# Patient Record
Sex: FEMALE | Race: WHITE | NOT HISPANIC OR LATINO | Employment: UNEMPLOYED | URBAN - METROPOLITAN AREA
[De-identification: names, ages, dates, MRNs, and addresses within clinical notes are randomized per-mention and may not be internally consistent; named-entity substitution may affect disease eponyms.]

---

## 2018-08-24 ENCOUNTER — HOSPITAL ENCOUNTER (INPATIENT)
Facility: HOSPITAL | Age: 48
LOS: 2 days | Discharge: HOME | DRG: 530 | End: 2018-08-26
Attending: EMERGENCY MEDICINE | Admitting: INTERNAL MEDICINE
Payer: COMMERCIAL

## 2018-08-24 ENCOUNTER — APPOINTMENT (EMERGENCY)
Dept: RADIOLOGY | Facility: HOSPITAL | Age: 48
DRG: 530 | End: 2018-08-24
Payer: COMMERCIAL

## 2018-08-24 ENCOUNTER — APPOINTMENT (EMERGENCY)
Dept: CT IMAGING | Facility: HOSPITAL | Age: 48
DRG: 530 | End: 2018-08-24
Payer: COMMERCIAL

## 2018-08-24 DIAGNOSIS — C53.9 CERVICAL CANCER (HCC): Primary | ICD-10-CM

## 2018-08-24 DIAGNOSIS — D50.9 MICROCYTIC ANEMIA: ICD-10-CM

## 2018-08-24 DIAGNOSIS — D50.9 IRON DEFICIENCY ANEMIA, UNSPECIFIED IRON DEFICIENCY ANEMIA TYPE: ICD-10-CM

## 2018-08-24 DIAGNOSIS — R07.9 CHEST PAIN: ICD-10-CM

## 2018-08-24 PROBLEM — D64.9 ANEMIA: Status: ACTIVE | Noted: 2018-08-24

## 2018-08-24 LAB
ALBUMIN SERPL BCP-MCNC: 2.9 G/DL (ref 3.5–5)
ALP SERPL-CCNC: 70 U/L (ref 46–116)
ALT SERPL W P-5'-P-CCNC: 21 U/L (ref 12–78)
ANION GAP SERPL CALCULATED.3IONS-SCNC: 6 MMOL/L (ref 4–13)
AST SERPL W P-5'-P-CCNC: 15 U/L (ref 5–45)
BASOPHILS # BLD AUTO: 0.04 THOUSANDS/ΜL (ref 0–0.1)
BASOPHILS NFR BLD AUTO: 0 % (ref 0–1)
BILIRUB SERPL-MCNC: 0.2 MG/DL (ref 0.2–1)
BUN SERPL-MCNC: 8 MG/DL (ref 5–25)
CALCIUM SERPL-MCNC: 7.9 MG/DL (ref 8.3–10.1)
CHLORIDE SERPL-SCNC: 104 MMOL/L (ref 100–108)
CO2 SERPL-SCNC: 26 MMOL/L (ref 21–32)
CREAT SERPL-MCNC: 0.69 MG/DL (ref 0.6–1.3)
DEPRECATED D DIMER PPP: 1553 NG/ML (FEU) (ref 0–424)
EOSINOPHIL # BLD AUTO: 0.06 THOUSAND/ΜL (ref 0–0.61)
EOSINOPHIL NFR BLD AUTO: 1 % (ref 0–6)
ERYTHROCYTE [DISTWIDTH] IN BLOOD BY AUTOMATED COUNT: 15.9 % (ref 11.6–15.1)
GFR SERPL CREATININE-BSD FRML MDRD: 103 ML/MIN/1.73SQ M
GLUCOSE SERPL-MCNC: 125 MG/DL (ref 65–140)
HCT VFR BLD AUTO: 23.1 % (ref 34.8–46.1)
HGB BLD-MCNC: 6.8 G/DL (ref 11.5–15.4)
IMM GRANULOCYTES # BLD AUTO: 0.04 THOUSAND/UL (ref 0–0.2)
IMM GRANULOCYTES NFR BLD AUTO: 0 % (ref 0–2)
LYMPHOCYTES # BLD AUTO: 0.91 THOUSANDS/ΜL (ref 0.6–4.47)
LYMPHOCYTES NFR BLD AUTO: 10 % (ref 14–44)
MCH RBC QN AUTO: 23.3 PG (ref 26.8–34.3)
MCHC RBC AUTO-ENTMCNC: 29.4 G/DL (ref 31.4–37.4)
MCV RBC AUTO: 79 FL (ref 82–98)
MONOCYTES # BLD AUTO: 1.07 THOUSAND/ΜL (ref 0.17–1.22)
MONOCYTES NFR BLD AUTO: 12 % (ref 4–12)
NEUTROPHILS # BLD AUTO: 7.2 THOUSANDS/ΜL (ref 1.85–7.62)
NEUTS SEG NFR BLD AUTO: 77 % (ref 43–75)
NRBC BLD AUTO-RTO: 0 /100 WBCS
PLATELET # BLD AUTO: 484 THOUSANDS/UL (ref 149–390)
PMV BLD AUTO: 8.7 FL (ref 8.9–12.7)
POTASSIUM SERPL-SCNC: 3.6 MMOL/L (ref 3.5–5.3)
PROT SERPL-MCNC: 7.1 G/DL (ref 6.4–8.2)
RBC # BLD AUTO: 2.92 MILLION/UL (ref 3.81–5.12)
SODIUM SERPL-SCNC: 136 MMOL/L (ref 136–145)
TROPONIN I SERPL-MCNC: <0.02 NG/ML
WBC # BLD AUTO: 9.32 THOUSAND/UL (ref 4.31–10.16)

## 2018-08-24 PROCEDURE — 96360 HYDRATION IV INFUSION INIT: CPT

## 2018-08-24 PROCEDURE — 96361 HYDRATE IV INFUSION ADD-ON: CPT

## 2018-08-24 PROCEDURE — 99223 1ST HOSP IP/OBS HIGH 75: CPT | Performed by: INTERNAL MEDICINE

## 2018-08-24 PROCEDURE — 71046 X-RAY EXAM CHEST 2 VIEWS: CPT

## 2018-08-24 PROCEDURE — 99285 EMERGENCY DEPT VISIT HI MDM: CPT

## 2018-08-24 PROCEDURE — 74177 CT ABD & PELVIS W/CONTRAST: CPT

## 2018-08-24 PROCEDURE — 93005 ELECTROCARDIOGRAM TRACING: CPT

## 2018-08-24 PROCEDURE — 85025 COMPLETE CBC W/AUTO DIFF WBC: CPT | Performed by: EMERGENCY MEDICINE

## 2018-08-24 PROCEDURE — 85379 FIBRIN DEGRADATION QUANT: CPT | Performed by: EMERGENCY MEDICINE

## 2018-08-24 PROCEDURE — 80053 COMPREHEN METABOLIC PANEL: CPT | Performed by: EMERGENCY MEDICINE

## 2018-08-24 PROCEDURE — 84484 ASSAY OF TROPONIN QUANT: CPT | Performed by: EMERGENCY MEDICINE

## 2018-08-24 PROCEDURE — 71275 CT ANGIOGRAPHY CHEST: CPT

## 2018-08-24 PROCEDURE — 36415 COLL VENOUS BLD VENIPUNCTURE: CPT | Performed by: EMERGENCY MEDICINE

## 2018-08-24 RX ORDER — ASCORBIC ACID 500 MG
500 TABLET ORAL DAILY
Status: DISCONTINUED | OUTPATIENT
Start: 2018-08-25 | End: 2018-08-26 | Stop reason: HOSPADM

## 2018-08-24 RX ORDER — ACETAMINOPHEN 325 MG/1
650 TABLET ORAL EVERY 6 HOURS PRN
Status: DISCONTINUED | OUTPATIENT
Start: 2018-08-24 | End: 2018-08-26 | Stop reason: HOSPADM

## 2018-08-24 RX ORDER — CHLORAL HYDRATE 500 MG
1000 CAPSULE ORAL DAILY
Status: DISCONTINUED | OUTPATIENT
Start: 2018-08-25 | End: 2018-08-26 | Stop reason: HOSPADM

## 2018-08-24 RX ORDER — ONDANSETRON 2 MG/ML
4 INJECTION INTRAMUSCULAR; INTRAVENOUS EVERY 6 HOURS PRN
Status: DISCONTINUED | OUTPATIENT
Start: 2018-08-24 | End: 2018-08-26 | Stop reason: HOSPADM

## 2018-08-24 RX ORDER — ONDANSETRON 2 MG/ML
4 INJECTION INTRAMUSCULAR; INTRAVENOUS ONCE
Status: DISCONTINUED | OUTPATIENT
Start: 2018-08-24 | End: 2018-08-24

## 2018-08-24 RX ADMIN — IOHEXOL 100 ML: 350 INJECTION, SOLUTION INTRAVENOUS at 21:22

## 2018-08-24 RX ADMIN — SODIUM CHLORIDE 1000 ML: 0.9 INJECTION, SOLUTION INTRAVENOUS at 20:21

## 2018-08-24 NOTE — ED PROVIDER NOTES
History  Chief Complaint   Patient presents with    Chest Pain     EMS reports "PT lives in a hotel, has a tumor on cervix, no oncologist, using natural remedies  Today started with fluttering heart and some right sided chest and rib pain  Pulse in upper 130's gave 500 of NSS and brought it down to 110's  PT states she hasnt been eating or drinking much recently"  PT reports "Using the [employer], high in HCT"     19-year-old female presents for evaluation after contacting EMS because of chest tightness and pain on the left side as well as some fluttering palpitations  Patient's heart rate was noted to be elevated upon EMS arrival   Patient reports that she was diagnosed with stage I cervical cancer in May of this year  Chemotherapy and radiation recommended by the oncologist at that time however she decided to pursue natural remedies  Patient states that she has always been in touch with the plants", 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  Patient has been using Nubia Teresa oil for the treatment of her cancer  This is a concentrated marijuana extract high and THC  Patient reports her appetite has been poor and that she has lost considerable weight  Patient's family convinced her to seek medical attention at this time  Patient reports she has been having some suprapubic and left lower quadrant abdominal pain as well          History provided by:  Patient   used: No    Chest Pain   Pain location:  L chest  Pain quality: aching and tightness    Pain radiates to:  Does not radiate  Pain radiates to the back: no    Pain severity:  Mild  Onset quality:  Gradual  Duration:  1 day  Timing:  Rare  Progression:  Waxing and waning  Chronicity:  New  Relieved by:  None tried  Worsened by:  Nothing tried  Ineffective treatments:  None tried  Associated symptoms: abdominal pain, anxiety, fatigue, nausea and palpitations    Associated symptoms: no cough, no diaphoresis, no fever, no shortness of breath and not vomiting        Prior to Admission Medications   Prescriptions Last Dose Informant Patient Reported? Taking? Ascorbic Acid (VITAMIN C ER PO)   Yes Yes   Sig: Take by mouth   Omega-3 Fatty Acids (FISH OIL CONCENTRATE PO)   Yes Yes   Sig: Take by mouth      Facility-Administered Medications: None       Past Medical History:   Diagnosis Date    Cervical cancer ([facility] [address] )     cervical       History reviewed  No pertinent surgical history  History reviewed  No pertinent family history  I have reviewed and agree with the history as documented  Social History   Substance Use Topics    Smoking status: Never Smoker    Smokeless tobacco: Never Used    Alcohol use No        Review of Systems   Constitutional: Positive for fatigue and unexpected weight change  Negative for chills, diaphoresis and fever  Respiratory: Negative for cough and shortness of breath  Cardiovascular: Positive for chest pain and palpitations  Gastrointestinal: Positive for abdominal pain and nausea  Negative for diarrhea and vomiting  Genitourinary: Negative for dysuria and frequency  Skin: Negative for rash  All other systems reviewed and are negative  Physical Exam  Physical Exam   Constitutional: She is oriented to person, place, and time  She appears well-developed and well-nourished  She appears distressed (mild)  Very thin, possibly cachectic   HENT:   Head: Normocephalic and atraumatic  Eyes: EOM are normal  Pupils are equal, round, and reactive to light  Neck: Normal range of motion  No JVD present  Cardiovascular: Regular rhythm, normal heart sounds and intact distal pulses  Tachycardia present  Exam reveals no gallop and no friction rub  No murmur heard  Pulmonary/Chest: Effort normal and breath sounds normal  No respiratory distress  She has no wheezes  She has no rales  She exhibits no tenderness     Abdominal: There is tenderness (left lower quadrant)  Musculoskeletal: Normal range of motion  She exhibits no tenderness  Neurological: She is alert and oriented to person, place, and time  Skin: Skin is warm and dry  Psychiatric: She has a normal mood and affect  Her behavior is normal  Judgment and thought content normal    Nursing note and vitals reviewed  Vital Signs  ED Triage Vitals [08/24/18 1953]   Temperature Pulse Respirations Blood Pressure SpO2   97 7 °F (36 5 °C) 100 18 127/71 100 %      Temp Source Heart Rate Source Patient Position - Orthostatic VS BP Location FiO2 (%)   Oral Monitor Lying Right arm --      Pain Score       --           Vitals:    08/24/18 1953   BP: 127/71   Pulse: 100   Patient Position - Orthostatic VS: Lying       Visual Acuity      ED Medications  Medications   sodium chloride 0 9 % bolus 1,000 mL (1,000 mL Intravenous New Bag 8/24/18 2021)   iohexol (OMNIPAQUE) 350 MG/ML injection (MULTI-DOSE) 100 mL (100 mL Intravenous Given 8/24/18 2122)       Diagnostic Studies  Results Reviewed     Procedure Component Value Units Date/Time    Comprehensive metabolic panel [[id number]]  (Abnormal) Collected:  08/24/18 2024    Lab Status:  Final result Specimen:  Blood from Arm, Left Updated:  08/24/18 2111     Sodium 136 mmol/L      Potassium 3 6 mmol/L      Chloride 104 mmol/L      CO2 26 mmol/L      Anion Gap 6 mmol/L      BUN 8 mg/dL      Creatinine 0 69 mg/dL      Glucose 125 mg/dL      Calcium 7 9 (L) mg/dL      AST 15 U/L      ALT 21 U/L      Alkaline Phosphatase 70 U/L      Total Protein 7 1 g/dL      Albumin 2 9 (L) g/dL      Total Bilirubin 0 20 mg/dL      eGFR 103 ml/min/1 73sq m     Narrative:         National Kidney Disease Education Program recommendations are as follows:  GFR calculation is accurate only with a steady state creatinine  Chronic Kidney disease less than 60 ml/min/1 73 sq  meters  Kidney failure less than 15 ml/min/1 73 sq  meters      D-Dimer [[id number]]  (Abnormal) Collected:  08/24/18 2024 Lab Status:  Final result Specimen:  Blood from Arm, Left Updated:  08/24/18 2108     D-Dimer, Quant 1,553 (H) ng/ml (FEU)     Troponin I [[id number]]  (Normal) Collected:  08/24/18 2024    Lab Status:  Final result Specimen:  Blood from Arm, Left Updated:  08/24/18 2106     Troponin I <0 02 ng/mL     CBC and differential [[id number]]  (Abnormal) Collected:  08/24/18 2024    Lab Status:  Final result Specimen:  Blood from Arm, Left Updated:  08/24/18 2050     WBC 9 32 Thousand/uL      RBC 2 92 (L) Million/uL      Hemoglobin 6 8 (LL) g/dL      Hematocrit 23 1 (L) %      MCV 79 (L) fL      MCH 23 3 (L) pg      MCHC 29 4 (L) g/dL      RDW 15 9 (H) %      MPV 8 7 (L) fL      Platelets 134 (H) Thousands/uL      nRBC 0 /100 WBCs      Neutrophils Relative 77 (H) %      Immat GRANS % 0 %      Lymphocytes Relative 10 (L) %      Monocytes Relative 12 %      Eosinophils Relative 1 %      Basophils Relative 0 %      Neutrophils Absolute 7 20 Thousands/µL      Immature Grans Absolute 0 04 Thousand/uL      Lymphocytes Absolute 0 91 Thousands/µL      Monocytes Absolute 1 07 Thousand/µL      Eosinophils Absolute 0 06 Thousand/µL      Basophils Absolute 0 04 Thousands/µL                  PE Study with CT Abdomen and Pelvis with contrast   Final Result by Fransisca Gill MD (08/24 2145)      Heterogeneous uterus with endocervical fluid, correlate with pelvic ultrasound  Patient has known history of cervical cancer  Bilateral enlarged pelvic sidewall lymph nodes are identified measuring approximately 1 1 cm on the right and 0 8 cm on the left suspicious for metastatic disease of cervical cancer  Oncologic consult is recommended and necessary when clinically    warranted  High density fluid is identified in the cul-de-sac which although could be physiologic for a patient of this age, although with history of cervical cancer this is indeterminate                Workstation performed: [id number]         XR chest 2 views   ED Interpretation by Melly Lay MD (08/24 2043)   This film was interpreted independently by me  No infiltrate, cardiac silhouette normal, no pleural effusions or pulmonary edema  Procedures  Procedures       Phone Contacts  ED Phone Contact    ED Course  ED Course as of Aug 24 2221   Fri Aug 24, 2018   2215 I relayed to the patient the findings of the CT scan indicating that it was likely that her cancer had spread to nearby lymph nodes which would characterize her cancer as metastatic  She understands but would like to finish her Theone Divehi oil regimen  Additionally I relayed to her the findings of anemia (microcytic) and that she was at a level that would commonly cause us to perform a transfusion  She is not interested in that at this time  She is willing to be admitted so that she can have a hematology/oncology evaluation                                   MDM  Number of Diagnoses or Management Options  Cervical cancer [facility]): new and requires workup  Chest pain: new and requires workup  Microcytic anemia: new and requires workup  Diagnosis management comments: Background: 49 y o  female presents with chest pain, palpitations, weight loss and abdominal pain in the setting of essentially untreated cervical cancer    Differential DX includes but is not limited to: acs, metabolic derangement, renal failure, metastatic spread, PE, anemia    Plan: cbc, metabolic panel, ct abdomen, ekg, troponin, ddimer          Amount and/or Complexity of Data Reviewed  Clinical lab tests: ordered and reviewed  Tests in the radiology section of CPT®: ordered and reviewed  Independent visualization of images, tracings, or specimens: yes    Risk of Complications, Morbidity, and/or Mortality  Presenting problems: high  Diagnostic procedures: high  Management options: high    Patient Progress  Patient progress: stable    CritCare Time    Disposition  Final diagnoses:   Microcytic anemia - acute   Cervical cancer [facility]) - with likely metastatic disease to lymph nodes   Chest pain     Time reflects when diagnosis was documented in both MDM as applicable and the Disposition within this note     Time User Action Codes Description Comment    8/24/2018 10:20 PM Lazarus Cedrick Add [D50 9] Microcytic anemia     8/24/2018 10:20 PM Glenora Mass B Modify [D50 9] Microcytic anemia acute    8/24/2018 10:20 PM Glenora Mass B Add [C53 9] Cervical cancer ([facility] [address] )     8/24/2018 10:20 PM Glenora Mass B Modify [C53 9] Cervical cancer [facility]) with likely metastatic disease to lymph nodes    8/24/2018 10:20 PM Lazarus Cedrick Add [R07 9] Chest pain     8/24/2018 10:20 PM Glenora Mass B Modify [D50 9] Microcytic anemia acute    8/24/2018 10:20 PM Glenora Mass B Modify [C53 9] Cervical cancer [facility]) with likely metastatic disease to lymph nodes      ED Disposition     ED Disposition Condition Comment    Admit  Case was discussed with Ruthann Kaufman and the patient's admission status was agreed to be Admission Status: inpatient status to the service of Dr Beni Garg   Follow-up Information    None         Patient's Medications   Discharge Prescriptions    No medications on file     No discharge procedures on file      ED Provider  Electronically Signed by           Ignacio Steel MD  08/24/18 4959

## 2018-08-25 LAB
ABO GROUP BLD: NORMAL
ANION GAP SERPL CALCULATED.3IONS-SCNC: 6 MMOL/L (ref 4–13)
BLD GP AB SCN SERPL QL: NEGATIVE
BUN SERPL-MCNC: 6 MG/DL (ref 5–25)
CALCIUM SERPL-MCNC: 8.3 MG/DL (ref 8.3–10.1)
CHLORIDE SERPL-SCNC: 106 MMOL/L (ref 100–108)
CO2 SERPL-SCNC: 27 MMOL/L (ref 21–32)
CREAT SERPL-MCNC: 0.6 MG/DL (ref 0.6–1.3)
ERYTHROCYTE [DISTWIDTH] IN BLOOD BY AUTOMATED COUNT: 15.9 % (ref 11.6–15.1)
FERRITIN SERPL-MCNC: 19 NG/ML (ref 8–388)
GFR SERPL CREATININE-BSD FRML MDRD: 108 ML/MIN/1.73SQ M
GLUCOSE SERPL-MCNC: 101 MG/DL (ref 65–140)
HCT VFR BLD AUTO: 26.3 % (ref 34.8–46.1)
HCT VFR BLD AUTO: 34.3 % (ref 34.8–46.1)
HEMOCCULT STL QL: NEGATIVE
HGB BLD-MCNC: 10.4 G/DL (ref 11.5–15.4)
HGB BLD-MCNC: 7.7 G/DL (ref 11.5–15.4)
IRON SATN MFR SERPL: 5 %
IRON SERPL-MCNC: 15 UG/DL (ref 50–170)
MCH RBC QN AUTO: 22.8 PG (ref 26.8–34.3)
MCHC RBC AUTO-ENTMCNC: 29.3 G/DL (ref 31.4–37.4)
MCV RBC AUTO: 78 FL (ref 82–98)
PLATELET # BLD AUTO: 336 THOUSANDS/UL (ref 149–390)
PMV BLD AUTO: 9.5 FL (ref 8.9–12.7)
POTASSIUM SERPL-SCNC: 4.2 MMOL/L (ref 3.5–5.3)
RBC # BLD AUTO: 3.37 MILLION/UL (ref 3.81–5.12)
RH BLD: POSITIVE
SODIUM SERPL-SCNC: 139 MMOL/L (ref 136–145)
SPECIMEN EXPIRATION DATE: NORMAL
TIBC SERPL-MCNC: 275 UG/DL (ref 250–450)
TROPONIN I SERPL-MCNC: <0.02 NG/ML
TROPONIN I SERPL-MCNC: <0.02 NG/ML
TSH SERPL DL<=0.05 MIU/L-ACNC: 0.86 UIU/ML (ref 0.36–3.74)
WBC # BLD AUTO: 8.14 THOUSAND/UL (ref 4.31–10.16)

## 2018-08-25 PROCEDURE — 83540 ASSAY OF IRON: CPT | Performed by: PHYSICIAN ASSISTANT

## 2018-08-25 PROCEDURE — 84484 ASSAY OF TROPONIN QUANT: CPT | Performed by: PHYSICIAN ASSISTANT

## 2018-08-25 PROCEDURE — 86901 BLOOD TYPING SEROLOGIC RH(D): CPT | Performed by: PHYSICIAN ASSISTANT

## 2018-08-25 PROCEDURE — 85027 COMPLETE CBC AUTOMATED: CPT | Performed by: PHYSICIAN ASSISTANT

## 2018-08-25 PROCEDURE — 99252 IP/OBS CONSLTJ NEW/EST SF 35: CPT | Performed by: INTERNAL MEDICINE

## 2018-08-25 PROCEDURE — P9021 RED BLOOD CELLS UNIT: HCPCS

## 2018-08-25 PROCEDURE — 85014 HEMATOCRIT: CPT | Performed by: INTERNAL MEDICINE

## 2018-08-25 PROCEDURE — 86850 RBC ANTIBODY SCREEN: CPT | Performed by: PHYSICIAN ASSISTANT

## 2018-08-25 PROCEDURE — 82728 ASSAY OF FERRITIN: CPT | Performed by: PHYSICIAN ASSISTANT

## 2018-08-25 PROCEDURE — 82272 OCCULT BLD FECES 1-3 TESTS: CPT | Performed by: PHYSICIAN ASSISTANT

## 2018-08-25 PROCEDURE — 80048 BASIC METABOLIC PNL TOTAL CA: CPT | Performed by: PHYSICIAN ASSISTANT

## 2018-08-25 PROCEDURE — 84443 ASSAY THYROID STIM HORMONE: CPT | Performed by: PHYSICIAN ASSISTANT

## 2018-08-25 PROCEDURE — 85018 HEMOGLOBIN: CPT | Performed by: INTERNAL MEDICINE

## 2018-08-25 PROCEDURE — 83550 IRON BINDING TEST: CPT | Performed by: PHYSICIAN ASSISTANT

## 2018-08-25 PROCEDURE — 30233N1 TRANSFUSION OF NONAUTOLOGOUS RED BLOOD CELLS INTO PERIPHERAL VEIN, PERCUTANEOUS APPROACH: ICD-10-PCS | Performed by: INTERNAL MEDICINE

## 2018-08-25 PROCEDURE — 86900 BLOOD TYPING SEROLOGIC ABO: CPT | Performed by: PHYSICIAN ASSISTANT

## 2018-08-25 PROCEDURE — 86920 COMPATIBILITY TEST SPIN: CPT

## 2018-08-25 RX ADMIN — OXYCODONE HYDROCHLORIDE AND ACETAMINOPHEN 500 MG: 500 TABLET ORAL at 10:16

## 2018-08-25 RX ADMIN — Medication 1000 MG: at 10:15

## 2018-08-25 NOTE — NURSING NOTE
Patient refused SCD's  Pt stated she "doesnt want to be connected to a machine"  Educated pt on reason and importance of wearing them but still declines at this time

## 2018-08-25 NOTE — CONSULTS
Consultation - Hao Mei 49 y o  female MRN: [id number]    Unit/Bed#: -01 Encounter: [id number]      Assessment/Plan     Assessment:  In summary, this is a 39-year-old female history of squamous cell carcinoma of the cervix who has pursued natural therapies over the past 4 months  CT scan shows some heterogeneity of the uterus, possibly representing metastatic disease  We reviewed that it is possible that her cancer is curable at this time 3 standard therapies including radiation, chemotherapy, surgery, or some combination of these  Also we reviewed that delay in treatment initiation could result in decreased likely survival, cure, etc   In other words, delay in treatment could lead to death from progressive cancer  Additionally, we reviewed that her anemia is due to iron deficiency  She denies any vaginal bleeding, melena, hematuria, etc   Etiology of iron deficiency is unclear  This may reflect deficient dietary intake due to dietary modification  Oral iron replacement was recommended  Parental iron replacement also considered  The patient declined both of these  She is clear that she does not wish to pursue radiation, chemotherapy, surgery  She voiced understanding of the above discussion and understands that without optimal therapy her cancer will most likely be progressive and eventually fatal   She wishes to continue pursuing natural treatments    Plan:  See above  History of Present Illness     HPI: Hao Mei is a 49y o  year old female who presents with shortness of breath  On admission hemoglobin was 6 8 April 2018 patient was diagnosed with apparently 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  She saw a physician in [place] who recommended standard therapies, surgical resection, etc   The patient declined an opted for natural therapies, THC oral, etc   She now presents with shortness of breath and severe anemia  WBC 9 3, MCV 79, platelet count 812  Iron saturation 5%, ferritin 19  BMP normal   CT Chest abdomen pelvis showed subcentimeter renal hypodensities  Heterogeneous uterus with endocervical fluid  Bilateral pelvic nodes up to 1 1 cm  No pulmonary lesions identified  Consults    Review of Systems   Constitutional: Positive for fatigue and unexpected weight change (10 lb weight loss over the past 4 months )  Negative for appetite change, diaphoresis and fever  HENT: Negative for sinus pain  Eyes: Negative for discharge  Respiratory: Positive for shortness of breath  Negative for cough  Cardiovascular: Negative for chest pain  Gastrointestinal: Negative for abdominal pain, constipation and diarrhea  Endocrine: Negative for cold intolerance  Genitourinary: Negative for difficulty urinating and hematuria  Musculoskeletal: Negative for joint swelling  Skin: Negative for rash  Allergic/Immunologic: Negative for environmental allergies  Neurological: Negative for dizziness and headaches  Hematological: Negative for adenopathy  Psychiatric/Behavioral: Negative for agitation  Historical Information   Past Medical History:   Diagnosis Date    Cervical cancer ([facility] [address] )     cervical     History reviewed  No pertinent surgical history  Social History   History   Alcohol Use No     History   Drug Use No     History   Smoking Status    Never Smoker   Smokeless Tobacco    Never Used     Family History: History reviewed  No pertinent family history  Meds/Allergies   all current active meds have been reviewed  No Known Allergies    Objective   Vitals: Blood pressure 107/56, pulse 72, temperature 98 5 °F (36 9 °C), temperature source Oral, resp  rate 18, weight 49 1 kg (108 lb 3 9 oz), SpO2 100 %      Intake/Output Summary (Last 24 hours) at 08/25/18 1514  Last data filed at 08/24/18 2225   Gross per 24 hour   Intake             1000 ml   Output                0 ml   Net             1000 ml     Invasive Devices     Peripheral Intravenous Line            Peripheral IV 08/25/18 Left Arm less than 1 day                Physical Exam   Constitutional: She is oriented to person, place, and time  She appears well-developed  HENT:   Head: Normocephalic  Eyes: Pupils are equal, round, and reactive to light  Neck: Neck supple  Cardiovascular: Normal rate  No murmur heard  Pulmonary/Chest: No respiratory distress  She has no wheezes  She has no rales  Abdominal: Soft  She exhibits no distension  There is no tenderness  There is no rebound  Musculoskeletal: She exhibits no edema  Lymphadenopathy:     She has no cervical adenopathy  Neurological: She is alert and oriented to person, place, and time  She displays normal reflexes  Skin: Skin is warm  No rash noted  Psychiatric: She has a normal mood and affect  Thought content normal        Lab Results: I have personally reviewed pertinent reports  Imaging Studies: I have personally reviewed pertinent reports  EKG, Pathology, and Other Studies: I have personally reviewed pertinent reports  Code Status: Level 1 - Full Code  Advance Directive and Living Will:      Power of :    POLST:      Counseling / Coordination of Care  Total floor / unit time spent today 40 minutes  Greater than 50% of total time was spent with the patient and / or family counseling and / or coordination of care  A description of the counseling / coordination of care:  See note

## 2018-08-25 NOTE — ASSESSMENT & PLAN NOTE
· Diagnosed with SCC of the cervix in April 2018 and has been using only natural remedies since then  · CTA chest, abdomen, pelvis: Bilateral enlarged pelvic sidewall lymph nodes are identified measuring approximately 1 1 cm on the right and 0 8 cm on the left suspicious for metastatic disease of cervical cancer  High density fluid is identified in the cul-de-sac  · Patient agreeable to medical oncology consult  Not agreeable to GYN oncology consult at this time  · Medical oncology consulted

## 2018-08-25 NOTE — ASSESSMENT & PLAN NOTE
· Hg 6 8 on presentation  · Patient declined blood transfusion in the ED  · Anemia is microcytic  · Consider IV Venofer  · Obtain iron panel, check fecal occult blood  · Hematology oncology consulted  · Repeat CBC in AM    · Informed patient that if there is a significant drop in her Hg overnight, blood transfusion will need to be re-addressed     · Obtain type and screen in AM

## 2018-08-25 NOTE — PLAN OF CARE
Problem: DISCHARGE PLANNING - CARE MANAGEMENT  Goal: Discharge to post-acute care or home with appropriate resources  INTERVENTIONS:  - Conduct assessment to determine patient/family and health care team treatment goals, and need for post-acute services based on payer coverage, community resources, and patient preferences, and barriers to discharge  - Address psychosocial, clinical, and financial barriers to discharge as identified in assessment in conjunction with the patient/family and health care team  - Arrange appropriate level of post-acute services according to patients   needs and preference and payer coverage in collaboration with the physician and health care team  - Communicate with and update the patient/family, physician, and health care team regarding progress on the discharge plan  - Arrange appropriate transportation to post-acute venues   Outcome: Progressing  CM received a call from pt's family friend that Pt was homeless  CM met with Pt at bedside  Pt reports she was staying in a hotel prior to hospitalization but is working on finding a place to live  Pt reports she is going to talk to her family as she was offered a room at the home of a friend from [employer]  Pt reports she has 4 daughters that are staying with family that she is in communication with  Pt does not have a history of DME, HHC, or Rehab  Pt denies any MH or D&A history  Pt reports she has a car but isn't currently driving, her family and friends assist her if she needs something  Pt does not have a POA and does not want info at this time  CM offered Pt shelter list but Pt declines and states she does not want to go to a shelter, she will have a plan in place  CM dept will follow Pt until discharge

## 2018-08-25 NOTE — SOCIAL WORK
CM received a call from pt's family friend that Pt was homeless  CM met with Pt at bedside  Pt reports she was staying in a hotel prior to hospitalization but is working on finding a place to live  Pt reports she is going to talk to her family as she was offered a room at the home of a friend from [employer]  Pt reports she has 4 daughters that are staying with family that she is in communication with  Pt does not have a history of DME, HHC, or Rehab  Pt denies any MH or D&A history  Pt reports she has a car but isn't currently driving, her family and friends assist her if she needs something  Pt does not have a POA and does not want info at this time  CM offered Pt shelter list but Pt declines and states she does not want to go to a shelter, she will have a plan in place  CM dept will follow Pt until discharge

## 2018-08-25 NOTE — ED NOTES
PT ambulated to bathroom and back to ED room, steady gait, no distress, no other complaints         April Kiki Dozier RN  08/24/18 5949

## 2018-08-25 NOTE — H&P
H&P- Boom Jimmy 1970, 49 y o  female MRN: [id number]    Unit/Bed#: -01 Encounter: [id number]    Primary Care Provider: Lashae Neil DO   Date and time admitted to hospital: 2018  7:40 PM        Microcytic anemia   Assessment & Plan    · Hg 6 8 on presentation  · Patient declined blood transfusion in the ED  · Anemia is microcytic  · Consider IV Venofer  · Obtain iron panel, check fecal occult blood  · Hematology oncology consulted  · Repeat CBC in AM    · Informed patient that if there is a significant drop in her Hg overnight, blood transfusion will need to be re-addressed  · Obtain type and screen in AM          * Chest pain   Assessment & Plan    · Presented with chest heaviness, palpitations  Pain improved at this time  · Initial troponin negative  · Monitor on telemetry overnight and trend troponin  · D-dimer elevated, therefore, CTA chest was obtained and showed no evidence of PE  Cervical cancer [facility])   Assessment & Plan    · Diagnosed with SCC of the cervix in 2018 and has been using only natural remedies since then  · CTA chest, abdomen, pelvis: Bilateral enlarged pelvic sidewall lymph nodes are identified measuring approximately 1 1 cm on the right and 0 8 cm on the left suspicious for metastatic disease of cervical cancer  High density fluid is identified in the cul-de-sac  · Patient agreeable to medical oncology consult  Not agreeable to GYN oncology consult at this time  · Medical oncology consulted  VTE Prophylaxis: Pharmacologic VTE Prophylaxis contraindicated due to anemia  / sequential compression device   Code Status: Level 1- Full Code   POLST: POLST form is not discussed and not completed at this time  Discussion with family: patient's daughter at bedside    Anticipated Length of Stay:  Patient will be admitted on an Inpatient basis with an anticipated length of stay of > 2 midnights     Justification for Hospital Stay: cervical CA, anemia  Need for oncology evaluation  Total Time for Visit, including Counseling / Coordination of Care: 45 minutes  Greater than 50% of this total time spent on direct patient counseling and coordination of care  Chief Complaint:   Chest pain    History of Present Illness:    Marissa Baird is a 49 y o  female with a recent diagnosis of cervical cancer who presents with chest pain  Patient notes intermittent left sided lower rib pain over the past few weeks since reportedly twisting/ pulling a muscle  She notes that her pain has not been worsening but has been persistent  Today, the patient reportedly developed chest heaviness and palpitations, feeling as though her heart was racing  She attributes some of her symptoms to anxiety  Patient was diagnosed with cervical cancer in April 2018 at [facility] and has since opted for natural remedies and states that she is not currently interested in chemotherapy, radiation or surgery  She has been using [employer] which is a cannabis oil product and notes that this has helped with her pelvic pain and she also notes that she has 10 days left of the treatment which she would like to complete  She notes that she initially did not have much of an appetite due to her abdominal pain but now that her abdominal pain has improved, she notes that her intake has remained limited as she watches what she eats in order to avoid foods that will "feed the cancer " She believes she may have lost some weight recently but is uncertain of how much weight she has lost  She denies recent fevers/ chills, notes occasional night sweats  She denies any vaginal bleeding over the past few days but has continued to have intermittent vaginal bleeding over the past few months since her diagnosis which she states are "like periods " She denies SOB at this time but notes that she has been trying to be aware of her breathing   She notes that her chest heaviness/ palpitations have improved since coming to the hospital  Patient reports that she has been taking vitamin C on a regular basis and has been seeking IV vitamin C  On arrival to the hospital, initial troponin was negative and CTA of the chest was obtained, revealing no evidence of PE but showed b/l enlarged pelvic sidewall lymph nodes measuring 1 1 cm on the right and 0 8 cm on the left suspicious for metastatic disease  Patient was also noted to have a Hg of 6 8 on admission and reportedly declined blood transfusion in the ED  Review of Systems:    Review of Systems   Constitutional: Positive for appetite change, diaphoresis (night sweats) and unexpected weight change  Cardiovascular: Positive for chest pain and palpitations  Genitourinary: Positive for vaginal bleeding (intermittent)  All other systems reviewed and are negative  Past Medical and Surgical History:     Past Medical History:   Diagnosis Date    Cervical cancer ([facility] [address] )     cervical       History reviewed  No pertinent surgical history  Meds/Allergies:    Prior to Admission medications    Medication Sig Start Date End Date Taking? Authorizing Provider   Ascorbic Acid (VITAMIN C ER PO) Take by mouth   Yes Historical Provider, MD   Omega-3 Fatty Acids (FISH OIL CONCENTRATE PO) Take by mouth   Yes Historical Provider, MD     I have reviewed home medications with patient personally  Allergies: No Known Allergies    Social History:     Marital Status:    Patient Pre-hospital Living Situation: Resides at home  Patient Pre-hospital Level of Mobility: ambulates without assistance  Patient Pre-hospital Diet Restrictions: none  Substance Use History:   History   Alcohol Use No     History   Smoking Status    Never Smoker   Smokeless Tobacco    Never Used     History   Drug Use No       Family History:    History reviewed  No pertinent family history      Physical Exam:     Vitals:   Blood Pressure: 114/57 (08/24/18 2252)  Pulse: 81 (08/24/18 2252)  Temperature: 98 8 °F (37 1 °C) (08/24/18 2252)  Temp Source: Oral (08/24/18 2252)  Respirations: 18 (08/24/18 2252)  Weight - Scale: 49 1 kg (108 lb 3 9 oz) (08/24/18 1953)  SpO2: 100 % (08/24/18 2252)    Physical Exam   Constitutional: She is oriented to person, place, and time  She appears well-developed and well-nourished  She appears ill  No distress  Thin   HENT:   Head: Normocephalic and atraumatic  Eyes: Conjunctivae are normal    Cardiovascular: Regular rhythm  Tachycardia present  Pulmonary/Chest: Effort normal and breath sounds normal  No respiratory distress  Abdominal: Soft  Bowel sounds are normal  There is no tenderness  Musculoskeletal: Normal range of motion  She exhibits no edema  Neurological: She is alert and oriented to person, place, and time  Skin: Skin is warm and dry  She is not diaphoretic  There is pallor  Psychiatric: She has a normal mood and affect  Her behavior is normal    Nursing note and vitals reviewed  Additional Data:     Lab Results: I have personally reviewed pertinent reports  Results from last 7 days  Lab Units 08/24/18 2024   WBC Thousand/uL 9 32   HEMOGLOBIN g/dL 6 8*   HEMATOCRIT % 23 1*   PLATELETS Thousands/uL 484*   NEUTROS PCT % 77*   LYMPHS PCT % 10*   MONOS PCT % 12   EOS PCT % 1       Results from last 7 days  Lab Units 08/24/18 2024   SODIUM mmol/L 136   POTASSIUM mmol/L 3 6   CHLORIDE mmol/L 104   CO2 mmol/L 26   BUN mg/dL 8   CREATININE mg/dL 0 69   CALCIUM mg/dL 7 9*   TOTAL PROTEIN g/dL 7 1   BILIRUBIN TOTAL mg/dL 0 20   ALK PHOS U/L 70   ALT U/L 21   AST U/L 15   GLUCOSE RANDOM mg/dL 125                   Imaging: I have personally reviewed pertinent reports  PE Study with CT Abdomen and Pelvis with contrast   Final Result by Christiano Soares MD (08/24 2145)      Heterogeneous uterus with endocervical fluid, correlate with pelvic ultrasound  Patient has known history of cervical cancer        Bilateral enlarged pelvic sidewall lymph nodes are identified measuring approximately 1 1 cm on the right and 0 8 cm on the left suspicious for metastatic disease of cervical cancer  Oncologic consult is recommended and necessary when clinically    warranted  High density fluid is identified in the cul-de-sac which although could be physiologic for a patient of this age, although with history of cervical cancer this is indeterminate  Workstation performed: [id number]         XR chest 2 views   ED Interpretation by Lory Noriega MD (08/24 2043)   This film was interpreted independently by me  No infiltrate, cardiac silhouette normal, no pleural effusions or pulmonary edema  Allscripts / Epic Records Reviewed: Yes     ** Please Note: This note has been constructed using a voice recognition system   **

## 2018-08-25 NOTE — NURSING NOTE
Patient refused telemetry  Pt stated that "she knows her heart is ok and she doesn't need it"  Educated patient on reasons for telemetry and what it does and pt still declines  Austin Malik from [facility] aware, will continue to monitor

## 2018-08-25 NOTE — ASSESSMENT & PLAN NOTE
· Presented with chest heaviness, palpitations  Pain improved at this time  · Initial troponin negative  · Monitor on telemetry overnight and trend troponin     · D-dimer elevated, therefore, CTA chest was obtained and showed no evidence of PE

## 2018-08-26 VITALS
OXYGEN SATURATION: 96 % | RESPIRATION RATE: 16 BRPM | DIASTOLIC BLOOD PRESSURE: 73 MMHG | HEART RATE: 75 BPM | WEIGHT: 108.25 LBS | SYSTOLIC BLOOD PRESSURE: 118 MMHG | TEMPERATURE: 98.3 F

## 2018-08-26 PROBLEM — R06.00 DYSPNEA: Status: ACTIVE | Noted: 2018-08-26

## 2018-08-26 PROBLEM — E44.0 MODERATE PROTEIN-CALORIE MALNUTRITION (HCC): Status: ACTIVE | Noted: 2018-08-26

## 2018-08-26 PROBLEM — R07.9 CHEST PAIN: Status: RESOLVED | Noted: 2018-08-24 | Resolved: 2018-08-26

## 2018-08-26 PROBLEM — R06.00 DYSPNEA: Status: RESOLVED | Noted: 2018-08-26 | Resolved: 2018-08-26

## 2018-08-26 LAB
ABO GROUP BLD BPU: NORMAL
BPU ID: NORMAL
CROSSMATCH: NORMAL
ERYTHROCYTE [DISTWIDTH] IN BLOOD BY AUTOMATED COUNT: 15.9 % (ref 11.6–15.1)
HCT VFR BLD AUTO: 32.9 % (ref 34.8–46.1)
HGB BLD-MCNC: 10 G/DL (ref 11.5–15.4)
MCH RBC QN AUTO: 23.8 PG (ref 26.8–34.3)
MCHC RBC AUTO-ENTMCNC: 30.4 G/DL (ref 31.4–37.4)
MCV RBC AUTO: 78 FL (ref 82–98)
PLATELET # BLD AUTO: 414 THOUSANDS/UL (ref 149–390)
PMV BLD AUTO: 8.5 FL (ref 8.9–12.7)
RBC # BLD AUTO: 4.21 MILLION/UL (ref 3.81–5.12)
UNIT DISPENSE STATUS: NORMAL
UNIT PRODUCT CODE: NORMAL
UNIT RH: NORMAL
WBC # BLD AUTO: 8.38 THOUSAND/UL (ref 4.31–10.16)

## 2018-08-26 PROCEDURE — 85027 COMPLETE CBC AUTOMATED: CPT | Performed by: INTERNAL MEDICINE

## 2018-08-26 PROCEDURE — 99239 HOSP IP/OBS DSCHRG MGMT >30: CPT | Performed by: INTERNAL MEDICINE

## 2018-08-26 RX ORDER — FERROUS SULFATE TAB EC 324 MG (65 MG FE EQUIVALENT) 324 (65 FE) MG
324 TABLET DELAYED RESPONSE ORAL
Qty: 60 TABLET | Refills: 0 | Status: SHIPPED | OUTPATIENT
Start: 2018-08-26

## 2018-08-26 RX ADMIN — Medication 1000 MG: at 09:25

## 2018-08-26 NOTE — CASE MANAGEMENT
Initial Clinical Review    Admission: Date/Time/Statement: 8/24/18 @ 2221     Orders Placed This Encounter   Procedures    Inpatient Admission (expected length of stay for this patient is greater than two midnights)     Standing Status:   Standing     Number of Occurrences:   1     Order Specific Question:   Admitting Physician     Answer:   Andrew Oshea [[id number]]     Order Specific Question:   Level of Care     Answer:   Med Surg [[id number]]     Order Specific Question:   Estimated length of stay     Answer:   More than 2 Midnights     Order Specific Question:   Certification     Answer:   I certify that inpatient services are medically necessary for this patient for a duration of greater than two midnights  See H&P and MD Progress Notes for additional information about the patient's course of treatment  48 yo female brought to ED by EMS, tachycardia,  chest pain  Hgb  6 8  Pt diagnosed with cervical cancer 4/2018 and has been treating with natural remedies  She initially refused blood transfusion 8/24  On 8/25 she consented to and received 1 unit of PRBC's  She has consented to oncology evaluation  ED: Date/Time/Mode of Arrival:   ED Arrival Information     Expected Arrival Acuity Means of Arrival Escorted By Service Admission Type    - 8/24/2018 19:40 Urgent Ambulance [facility] General Medicine Urgent    Arrival Complaint    -         Hct 23 1  Chief Complaint:   Chief Complaint   Patient presents with    Chest Pain     EMS reports "PT lives in a hotel, has a tumor on cervix, no oncologist, using natural remedies  Today started with fluttering heart and some right sided chest and rib pain  Pulse in upper 130's gave 500 of NSS and brought it down to 110's   PT states she hasnt been eating or drinking much recently"  PT reports "Using the [employer], high in HCT"       History of Illness: Marcus Johnson is a 49 y o  female with a recent diagnosis of cervical cancer who presents with chest pain  Today, the patient reportedly developed chest heaviness and palpitations, feeling as though her heart was racing  She attributes some of her symptoms to anxiety  Patient was diagnosed with cervical cancer in April 2018 at [facility] and has since opted for natural remedies and states that she is not currently interested in chemotherapy, radiation or surgery  She has been using [employer] which is a cannabis oil product and notes that this has helped with her pelvic pain and she also notes that she has 10 days left of the treatment which she would like to complete  She has continued to have intermittent vaginal bleeding over the past few months since her diagnosis   On arrival to the hospital, initial troponin was negative and CTA of the chest was obtained, revealing no evidence of PE but showed b/l enlarged pelvic sidewall lymph nodes measuring 1 1 cm on the right and 0 8 cm on the left suspicious for metastatic disease  Patient was also noted to have a Hg of 6 8 on admission and reportedly declined blood transfusion in the ED       ED Vital Signs:   ED Triage Vitals   Temperature Pulse Respirations Blood Pressure SpO2   08/24/18 1953 08/24/18 1953 08/24/18 1953 08/24/18 1953 08/24/18 1953   97 7 °F (36 5 °C) 100 18 127/71 100 %      Temp Source Heart Rate Source Patient Position - Orthostatic VS BP Location FiO2 (%)   08/24/18 1953 08/24/18 1953 08/24/18 1953 08/24/18 1953 --   Oral Monitor Lying Right arm       Pain Score       08/24/18 2255       No Pain        Wt Readings from Last 1 Encounters:   08/24/18 49 1 kg (108 lb 3 9 oz)       Vital Signs (abnormal):  WNL    Abnormal Labs/Diagnostic Test Results:     D Dimer 1,553  Troponin <0 02, <0 02    Lab Units 08/24/18 2024   WBC Thousand/uL 9 32   HEMOGLOBIN g/dL 6 8*   HEMATOCRIT % 23 1*   PLATELETS Thousands/uL 484*     PE study - CT chest  PE Study with CT Abdomen and Pelvis with contrast   Final Result by Ana Lilia Vann MD (08/24 2145)     Heterogeneous uterus with endocervical fluid, correlate with pelvic ultrasound  Patient has known history of cervical cancer        Bilateral enlarged pelvic sidewall lymph nodes are identified measuring approximately 1 1 cm on the right and 0 8 cm on the left suspicious for metastatic disease of cervical cancer  Oncologic consult is recommended and necessary when clinically    warranted        High density fluid is identified in the cul-de-sac which although could be physiologic for a patient of this age, although with history of cervical cancer this is indeterminate  Iron 15 ( is normal range)    ED Treatment:   Medication Administration from 08/24/2018 1940 to 08/24/2018 2241       Date/Time Order Dose Route Action Comments     08/24/2018 2225 sodium chloride 0 9 % bolus 1,000 mL 0 mL Intravenous Stopped      08/24/2018 2021 sodium chloride 0 9 % bolus 1,000 mL 1,000 mL Intravenous New Bag      08/24/2018 2122 iohexol (OMNIPAQUE) 350 MG/ML injection (MULTI-DOSE) 100 mL 100 mL Intravenous Given           Past Medical/Surgical History: Active Ambulatory Problems     Diagnosis Date Noted    No Active Ambulatory Problems     Resolved Ambulatory Problems     Diagnosis Date Noted    No Resolved Ambulatory Problems     Past Medical History:   Diagnosis Date    [facility]        Admitting Diagnosis: Microcytic anemia [D50 9]  Cervical cancer ([facility] [address] ) [C53 9]  Chest pain [R07 9]    Age/Sex: 49 y o  female    Assessment/Plan:   Microcytic anemia   Assessment & Plan     · Hg 6 8 on presentation  ? Patient declined blood transfusion in the ED  · Anemia is microcytic  · Consider IV Venofer  · Obtain iron panel, check fecal occult blood  · Hematology oncology consulted  · Repeat CBC in AM    ? Informed patient that if there is a significant drop in her Hg overnight, blood transfusion will need to be re-addressed  ?  Obtain type and screen in AM            * Chest pain   Assessment & Plan   · Presented with chest heaviness, palpitations  Pain improved at this time  · Initial troponin negative  · Monitor on telemetry overnight and trend troponin  · D-dimer elevated, therefore, CTA chest was obtained and showed no evidence of PE           Cervical cancer [facility])   Assessment & Plan     · Diagnosed with SCC of the cervix in April 2018 and has been using only natural remedies since then  · CTA chest, abdomen, pelvis: Bilateral enlarged pelvic sidewall lymph nodes are identified measuring approximately 1 1 cm on the right and 0 8 cm on the left suspicious for metastatic disease of cervical cancer  High density fluid is identified in the cul-de-sac  · Patient agreeable to medical oncology consult  Not agreeable to GYN oncology consult at this time  ? Medical oncology consulted               VTE Prophylaxis: Pharmacologic VTE Prophylaxis contraindicated due to anemia  / sequential compression device   Code Status: Level 1- Full Code      Anticipated Length of Stay:  Patient will be admitted on an Inpatient basis with an anticipated length of stay of > 2 midnights  Justification for Hospital Stay: cervical CA, anemia  Need for oncology evaluation  Admission Orders:  Scheduled Meds:   Current Facility-Administered Medications:  acetaminophen 650 mg Oral Q6H PRN Temo Yeager PA-C   ascorbic acid 500 mg Oral Daily Temo Yeager PA-C   fish oil 1,000 mg Oral Daily Yessica Multani PA-C   ondansetron 4 mg Intravenous Q6H PRN Temo Yeager PA-C     Continuous Infusions:    PRN Meds:   acetaminophen    ondansetron    Consult medical oncology  CBC 8/25 H/H 7 7/26 3  8/25 Transfuse 1 unit PRBC   Regular diet  Telemetry  SCD's      ===============================================================  Medical oncology note 8/25  Assessment:  In summary, this is a 28-year-old female history of squamous cell carcinoma of the cervix who has pursued natural therapies over the past 4 months    CT scan shows some heterogeneity of the uterus, possibly representing metastatic disease  We reviewed that it is possible that her cancer is curable at this time 3 standard therapies including radiation, chemotherapy, surgery, or some combination of these  Also we reviewed that delay in treatment initiation could result in decreased likely survival, cure, etc   In other words, delay in treatment could lead to death from progressive cancer  Additionally, we reviewed that her anemia is due to iron deficiency  She denies any vaginal bleeding, melena, hematuria, etc   Etiology of iron deficiency is unclear  This may reflect deficient dietary intake due to dietary modification  Oral iron replacement was recommended  Parental iron replacement also considered  The patient declined both of these  She is clear that she does not wish to pursue radiation, chemotherapy, surgery  She voiced understanding of the above discussion and understands that without optimal therapy her cancer will most likely be progressive and eventually fatal   She wishes to continue pursuing natural treatments    Plan:  See above  Thank you,  [facility] Review Department  Phone: [phone]; Fax [phone]  ATTENTION: Please call with any questions or concerns to [phone]  and carefully follow the prompts so that you are directed to the right person  Send all requests for admission clinical reviews, approved or denied determinations and any other requests to fax [phone]   All voicemails are confidential

## 2018-08-26 NOTE — DISCHARGE SUMMARY
Discharge Summary - [facility]    Patient Information: Marissa Baird 49 y o  female MRN: [id number]  Unit/Bed#: -01 Encounter: [id number]    Discharging Physician / Practitioner: Xiomara Bartlett DO  PCP: Fantasma Garrett DO  Admission Date: 8/24/2018  Discharge Date: 08/26/18    Disposition:     Home    Reason for Admission: Shortness of Breath / Chest Pain    Discharge Diagnoses:     Principal Problem:    Iron deficiency anemia  Active Problems:    Cervical cancer ([address] )    Moderate protein-calorie malnutrition ([facility] [address] )  Resolved Problems:    Chest pain    Dyspnea    Consultations During Hospital Stay:  · Hematology/oncology - Dr Hermes Bruce    Procedures Performed:     · None    Significant Findings / Test Results:     · Chest x-ray - no acute cardiopulmonary disease  No consolidations or congestion  Mild scoliosis with convexity to the right side  · Contrast enhanced CT of the chest with CT of the abdomen and pelvis - heterogeneous uterus with endocervical fluid  Bilateral enlarged pelvic sidewall lymph nodes measuring 1 1 cm on the right and 0 8 cm on the left suspicious for metastatic cervical cancer  High-density fluid identified in the cul de sac which could be physiologic for patient of this age  Although, given history of cervical cancer, the etiology of this fluid is indeterminate  · Hemoglobin on admission was 6 8  It did spontaneously come up to 7 7 and after transfusion hemoglobin has come up to 10 4 with hematocrit of 34 3  Hemoglobin on the day of discharge is 10 0 with hematocrit of 32 9  MCV has typically been in the high 70s  White blood cell counts and platelet counts have been within normal range  · Iron study showed an iron of 15, ferritin of 19, TIBC of 275  This is consistent with an iron-deficiency anemia  · Stool is negative for blood  · Troponin less than 0 02 for 3 different levels  · D-dimer was 1553  · TSH was 0 857    · Metabolic profile grossly within normal limits during this admission  · Albumin is slightly low at 2 9  Incidental Findings:   · None     Test Results Pending at Discharge (will require follow up): · None     Outpatient Tests Requested:  · None    Complications:  None    Hospital Course:     Guilherme Dukes is a 49 y o  female patient who originally presented to the hospital on 8/24/2018 due to development of a left lower sided rib pain and shortness of breath that she reported started after twisting and feeling like she pulled a muscle on that side  On the day of admission the patient had also reported development of chest heaviness and palpitations and a sensation that her heart was racing  The patient had felt that she was anxious  The patient presents to the hospital for her symptoms  In the emergency department a D-dimer was done given a known history of cervical cancer and when this came back positive a CT of the chest abdomen and pelvis was done  There is no pulmonary embolism present  The patient had some increase in her cancer which is described in more detail above  The patient had troponins done which were all negative  EKG showed no significant ST or T-wave abnormalities  The patient had some reproducibility of the pain on exam   The patient states that the pain has improved but she was concerned that she tore something or injured something that caused her to be short of breath  The patient was found on labs in the emergency department to be significantly anemic with a hemoglobin of 6 8  Without significant intervention the hemoglobin did come up to 7 7 and the patient started feeling a little bit better in terms of her breathing  She was also less anxious based on knowing the CT scan results and that there was nothing wrong with her lungs  Initially the patient had refused all treatments including iron supplements, iron infusion, and transfusion    After discussion on 08/25/2018 the patient was agreeable to blood transfusion and she was given 1 unit packed red blood cells  Today the hemoglobin is up over 10 and she has no significant shortness of breath symptoms  We do feel that the shortness of breath and possibly even the chest pain was related to iron deficiency anemia  Alternatively the chest pain could be related to musculoskeletal causes  The patient is recommended to take iron supplements but it is possible that she may decide to avoid these and use iron rich foods only  Instructions are given including which foods are on iron rich  The patient will follow up with her primary care physician for any further workup for the anemia  With regards to her cancer which appeared to be worsening on CT scan, she was seen by Hematology/Oncology and recommendations were made for additional testing and treatment which may include anything in the room of surgery, radiation and potentially in chemotherapy if needed which would potentially be curative  However, the patient is taking [employer], a cannabis containing oil which she found on her own and has been committed to for 2 5 months with another 2 weeks left for this course to be done  I did recommend that the patient consider the treatments that were recommended to her given that the cancer appears to be worsening despite 2 and half months of this alternative treatment  However, at this point, the patient is not interested in the conventional therapies that are recommended  We did caution the patient that if she waits too long that care may not be possible  At this point, we do feel that the symptoms that brought the patient into the hospital are now stable and that she is able to be safely discharged from the hospital   The patient will follow up with her primary care physician in the outpatient setting  Condition at Discharge: stable     Discharge Day Visit / Exam:     Subjective:   The patient feels overall much better today and does not have dyspnea or chest pain  Her energy is better she states after transfusion although she had a short period this morning where she generally did not feel well, but that is better  No nausea  She will continue on her current home remedy plan for her cancer, but seems a little bit more open to pursuing conventional treatment measures at the end of our conversation today  Vitals: Blood Pressure: 118/73 (08/26/18 0700)  Pulse: 75 (08/26/18 0700)  Temperature: 98 3 °F (36 8 °C) (08/26/18 0700)  Temp Source: Oral (08/26/18 0700)  Respirations: 16 (08/26/18 0700)  Weight - Scale: 49 1 kg (108 lb 3 9 oz) (08/24/18 1953)  SpO2: 96 % (08/26/18 0700)  Exam:   Physical Exam   Constitutional: She is oriented to person, place, and time  Malnourished  HENT:   Mouth/Throat: Oropharynx is clear and moist  No oropharyngeal exudate  Eyes: Conjunctivae are normal  Pupils are equal, round, and reactive to light  Cardiovascular: Normal rate and regular rhythm  No murmur heard  Pulmonary/Chest: Effort normal  She has no wheezes  She has no rales  Abdominal: Soft  Bowel sounds are normal  She exhibits no distension  There is no tenderness  Musculoskeletal: She exhibits no edema  Neurological: She is alert and oriented to person, place, and time  No cranial nerve deficit  Skin: No rash noted  Improved skin color today / not as pale  Psychiatric: She has a normal mood and affect  Discussion with Family: Patient has asked us not to contact any of her daughters  Discharge instructions/Information to patient and family:   See after visit summary for information provided to patient and family  Provisions for Follow-Up Care:  See after visit summary for information related to follow-up care and any pertinent home health orders  Planned Readmission: None     Discharge Statement:  I spent 32 minutes discharging the patient  This time was spent on the day of discharge   I had direct contact with the patient on the day of discharge  Greater than 50% of the total time was spent examining patient, answering all patient questions, arranging and discussing plan of care with patient as well as directly providing post-discharge instructions  Additional time then spent on discharge activities  Discharge Medications:  See after visit summary for reconciled discharge medications provided to patient and family        ** Please Note: This note has been constructed using a voice recognition system **

## 2018-08-27 LAB
ATRIAL RATE: 93 BPM
P AXIS: 60 DEGREES
PR INTERVAL: 132 MS
QRS AXIS: -59 DEGREES
QRSD INTERVAL: 72 MS
QT INTERVAL: 326 MS
QTC INTERVAL: 405 MS
T WAVE AXIS: 62 DEGREES
VENTRICULAR RATE: 93 BPM

## 2018-08-27 PROCEDURE — 93010 ELECTROCARDIOGRAM REPORT: CPT | Performed by: INTERNAL MEDICINE

## 2018-08-27 NOTE — CASE MANAGEMENT
Notification of Inpatient Admission/Inpatient Authorization Request  This is a Notification of Inpatient Admission/Request for Inpatient Authorization for our facility [address] [facility]  Please be advised that this patient is currently in our facility under Inpatient Status  Below you will find the Attending Physician and Facilitys information including NPI#  and contact information for the Utilization Review Department where the patient is receiving care services  Place of Service Code: 24   Place of Service Name: Inpatient Hospital  Presentation Date & Time: 8/24/2018  7:40 PM  Inpatient Admission Date & Time: 8/24/18 2221  Discharge Date & Time: 8/26/2018  7:39 PM   Discharge Disposition (if discharged): Home/Self Care  Attending Physician:  Cheli Gandhi  Specialty- Hospitalist  UPIN Number - Kimberly Jesus 994 ID- [id number]  [address], [place] [facility]  Phone 1: [phone]  Fax: [phone]  Admission Orders:   Admission Orders     Ordered        08/24/18 2221  Inpatient Admission (expected length of stay for this patient is greater than two midnights)  Once               Facility: [address] [place] [address],[address]  Address: [place] [place] [address]    Phone: [phone]  Tax ID [id number]  NPI [id number]  Medicare: [id number]  Thank you,  Ramesh Ma Utilization Review Department  Phone: [phone]; Fax [phone]  ATTENTION: Please call with any questions or concerns to [phone]  and carefully follow the prompts so that you are directed to the right person  Send all requests for admission clinical reviews, approved or denied determinations and any other requests to fax [phone]  All voicemails are confidential     Notification of Discharge  This is a Notification of Discharge from our facility [address]  Please be advised that this patient has been discharge from our facility  Below you will find the admission and discharge date and time including the patients disposition  PRESENTATION DATE: 8/24/2018  7:40 PM  IP ADMISSION DATE: 8/24/18 2221  DISCHARGE DATE: 8/26/2018  7:39 PM  DISPOSITION: Home/Self Care    [address] [facility]  Phone: [phone]; Fax [phone]  ATTENTION: The Network Utilization Review Department is now centralized for our 9 Facilities  Make a note that we have a new phone and fax numbers for our Department  Please call with any questions or concerns to [phone] and carefully follow the prompts so that you are directed to the right person  All voicemails are confidential  Fax any determinations, approvals, denials, and requests for initial or continue stay review clinical to [phone]  Due to HIGH CALL volume, it would be easier if you could please send faxed requests to expedite your requests and in part, help us provide discharge notifications faster

## 2018-09-12 NOTE — CASE MANAGEMENT
Notification of Discharge  This is a Notification of Discharge from our facility [address]  Please be advised that this patient has been discharge from our facility  Below you will find the admission and discharge date and time including the patients disposition  PRESENTATION DATE: 8/24/2018  7:40 PM  IP ADMISSION DATE: 8/24/18 2221  DISCHARGE DATE: 8/26/2018  7:39 PM  DISPOSITION: Home/Self Care    [address] [facility] in the [facility] by [facility] Utilization Review Department  Phone: [phone]; Fax [phone]  ATTENTION: The Network Utilization Review Department is now centralized for our 9 Facilities  Make a note that we have a new phone and fax numbers for our Department  Please call with any questions or concerns to [phone] and carefully follow the prompts so that you are directed to the right person  All voicemails are confidential  Fax any determinations, approvals, denials, and requests for initial or continue stay review clinical to [phone]  Due to HIGH CALL volume, it would be easier if you could please send faxed requests to expedite your requests and in part, help us provide discharge notifications faster    Reference #[id number]

## 2019-02-12 ENCOUNTER — TRANSCRIBE ORDERS (OUTPATIENT)
Dept: ADMINISTRATIVE | Facility: HOSPITAL | Age: 49
End: 2019-02-12

## 2019-02-12 DIAGNOSIS — C53.9 MALIGNANT NEOPLASM OF CERVIX, UNSPECIFIED SITE (HCC): Primary | ICD-10-CM

## 2019-02-20 ENCOUNTER — TRANSCRIBE ORDERS (OUTPATIENT)
Dept: ADMINISTRATIVE | Facility: HOSPITAL | Age: 49
End: 2019-02-20

## 2019-02-20 DIAGNOSIS — C53.9 MALIGNANT NEOPLASM OF CERVIX, UNSPECIFIED SITE (HCC): Primary | ICD-10-CM

## 2021-05-12 ENCOUNTER — TRANSCRIBE ORDERS (OUTPATIENT)
Dept: ADMINISTRATIVE | Facility: HOSPITAL | Age: 51
End: 2021-05-12

## 2021-05-12 DIAGNOSIS — H90.12 CONDUCTIVE HEARING LOSS, UNILATERAL, LEFT EAR, WITH UNRESTRICTED HEARING ON THE CONTRALATERAL SIDE: Primary | ICD-10-CM

## 2021-05-26 ENCOUNTER — HOSPITAL ENCOUNTER (OUTPATIENT)
Dept: CT IMAGING | Facility: HOSPITAL | Age: 51
Discharge: HOME | End: 2021-05-26
Payer: COMMERCIAL

## 2021-05-26 DIAGNOSIS — H90.12 CONDUCTIVE HEARING LOSS, UNILATERAL, LEFT EAR, WITH UNRESTRICTED HEARING ON THE CONTRALATERAL SIDE: ICD-10-CM

## 2021-05-26 PROCEDURE — 70480 CT ORBIT/EAR/FOSSA W/O DYE: CPT

## 2021-05-26 PROCEDURE — G1004 CDSM NDSC: HCPCS

## 2021-12-07 ENCOUNTER — HOSPITAL ENCOUNTER (INPATIENT)
Facility: HOSPITAL | Age: 51
LOS: 3 days | Discharge: HOME | DRG: 254 | End: 2021-12-10
Attending: EMERGENCY MEDICINE | Admitting: INTERNAL MEDICINE
Payer: COMMERCIAL

## 2021-12-07 DIAGNOSIS — E87.6 HYPOKALEMIA: ICD-10-CM

## 2021-12-07 DIAGNOSIS — K92.2 LOWER GI BLEEDING: Primary | ICD-10-CM

## 2021-12-07 DIAGNOSIS — D62 ACUTE ON CHRONIC BLOOD LOSS ANEMIA: ICD-10-CM

## 2021-12-07 DIAGNOSIS — D72.819 LEUKOPENIA: ICD-10-CM

## 2021-12-07 DIAGNOSIS — D64.9 SYMPTOMATIC ANEMIA: ICD-10-CM

## 2021-12-07 DIAGNOSIS — D50.9 MICROCYTIC ANEMIA: ICD-10-CM

## 2021-12-07 LAB
ABO GROUP BLD: NORMAL
ALBUMIN SERPL BCP-MCNC: 3.6 G/DL (ref 3.5–5)
ALP SERPL-CCNC: 55 U/L (ref 46–116)
ALT SERPL W P-5'-P-CCNC: 35 U/L (ref 12–78)
ANION GAP SERPL CALCULATED.3IONS-SCNC: 10 MMOL/L (ref 4–13)
APTT PPP: 26 SECONDS (ref 23–37)
AST SERPL W P-5'-P-CCNC: 16 U/L (ref 5–45)
ATRIAL RATE: 96 BPM
BASOPHILS # BLD AUTO: 0.01 THOUSANDS/ΜL (ref 0–0.1)
BILIRUB SERPL-MCNC: 0.19 MG/DL (ref 0.2–1)
BLD GP AB SCN SERPL QL: NEGATIVE
BUN SERPL-MCNC: 13 MG/DL (ref 5–25)
CALCIUM SERPL-MCNC: 8 MG/DL (ref 8.3–10.1)
CHLORIDE SERPL-SCNC: 104 MMOL/L (ref 100–108)
CO2 SERPL-SCNC: 24 MMOL/L (ref 21–32)
CREAT SERPL-MCNC: 0.92 MG/DL (ref 0.6–1.3)
EOSINOPHIL # BLD AUTO: 0.01 THOUSAND/ΜL (ref 0–0.61)
EOSINOPHIL NFR BLD AUTO: 1 % (ref 0–6)
ERYTHROCYTE [DISTWIDTH] IN BLOOD BY AUTOMATED COUNT: 17.2 % (ref 11.6–15.1)
GFR SERPL CREATININE-BSD FRML MDRD: 72 ML/MIN/1.73SQ M
GLUCOSE SERPL-MCNC: 119 MG/DL (ref 65–140)
HCT VFR BLD AUTO: 14.8 % (ref 34.8–46.1)
HGB BLD-MCNC: 3.8 G/DL (ref 11.5–15.4)
IMM GRANULOCYTES # BLD AUTO: 0.02 THOUSAND/UL (ref 0–0.2)
IMM GRANULOCYTES NFR BLD AUTO: 1 % (ref 0–2)
INR PPP: 1.08 (ref 0.84–1.19)
LYMPHOCYTES # BLD AUTO: 0.3 THOUSANDS/ΜL (ref 0.6–4.47)
LYMPHOCYTES NFR BLD AUTO: 12 % (ref 14–44)
MCH RBC QN AUTO: 18.5 PG (ref 26.8–34.3)
MCHC RBC AUTO-ENTMCNC: 25.7 G/DL (ref 31.4–37.4)
MCV RBC AUTO: 72 FL (ref 82–98)
MONOCYTES # BLD AUTO: 0.25 THOUSAND/ΜL (ref 0.17–1.22)
MONOCYTES NFR BLD AUTO: 10 % (ref 4–12)
NEUTROPHILS # BLD AUTO: 1.89 THOUSANDS/ΜL (ref 1.85–7.62)
NEUTS SEG NFR BLD AUTO: 76 % (ref 43–75)
P AXIS: 88 DEGREES
PLATELET # BLD AUTO: 283 THOUSANDS/UL (ref 149–390)
PMV BLD AUTO: 9.7 FL (ref 8.9–12.7)
POTASSIUM SERPL-SCNC: 3.3 MMOL/L (ref 3.5–5.3)
PR INTERVAL: 132 MS
PROT SERPL-MCNC: 6.8 G/DL (ref 6.4–8.2)
PROTHROMBIN TIME: 14 SECONDS (ref 11.6–14.5)
QRS AXIS: 54 DEGREES
QRSD INTERVAL: 74 MS
QT INTERVAL: 324 MS
QTC INTERVAL: 409 MS
RBC # BLD AUTO: 2.05 MILLION/UL (ref 3.81–5.12)
RH BLD: POSITIVE
SODIUM SERPL-SCNC: 138 MMOL/L (ref 136–145)
SPECIMEN EXPIRATION DATE: NORMAL
T WAVE AXIS: 66 DEGREES
VENTRICULAR RATE: 96 BPM
WBC # BLD AUTO: 2.48 THOUSAND/UL (ref 4.31–10.16)

## 2021-12-07 PROCEDURE — 99285 EMERGENCY DEPT VISIT HI MDM: CPT | Performed by: EMERGENCY MEDICINE

## 2021-12-07 PROCEDURE — 86900 BLOOD TYPING SEROLOGIC ABO: CPT | Performed by: EMERGENCY MEDICINE

## 2021-12-07 PROCEDURE — 36415 COLL VENOUS BLD VENIPUNCTURE: CPT | Performed by: EMERGENCY MEDICINE

## 2021-12-07 PROCEDURE — 85025 COMPLETE CBC W/AUTO DIFF WBC: CPT | Performed by: EMERGENCY MEDICINE

## 2021-12-07 PROCEDURE — 86920 COMPATIBILITY TEST SPIN: CPT

## 2021-12-07 PROCEDURE — 99223 1ST HOSP IP/OBS HIGH 75: CPT | Performed by: INTERNAL MEDICINE

## 2021-12-07 PROCEDURE — P9040 RBC LEUKOREDUCED IRRADIATED: HCPCS

## 2021-12-07 PROCEDURE — 85730 THROMBOPLASTIN TIME PARTIAL: CPT | Performed by: EMERGENCY MEDICINE

## 2021-12-07 PROCEDURE — 85610 PROTHROMBIN TIME: CPT | Performed by: EMERGENCY MEDICINE

## 2021-12-07 PROCEDURE — 86901 BLOOD TYPING SEROLOGIC RH(D): CPT | Performed by: EMERGENCY MEDICINE

## 2021-12-07 PROCEDURE — 80053 COMPREHEN METABOLIC PANEL: CPT | Performed by: EMERGENCY MEDICINE

## 2021-12-07 PROCEDURE — 93005 ELECTROCARDIOGRAM TRACING: CPT

## 2021-12-07 PROCEDURE — 83540 ASSAY OF IRON: CPT | Performed by: EMERGENCY MEDICINE

## 2021-12-07 PROCEDURE — 86850 RBC ANTIBODY SCREEN: CPT | Performed by: EMERGENCY MEDICINE

## 2021-12-07 PROCEDURE — 93010 ELECTROCARDIOGRAM REPORT: CPT | Performed by: INTERNAL MEDICINE

## 2021-12-07 PROCEDURE — 83550 IRON BINDING TEST: CPT | Performed by: EMERGENCY MEDICINE

## 2021-12-07 PROCEDURE — 82728 ASSAY OF FERRITIN: CPT | Performed by: EMERGENCY MEDICINE

## 2021-12-07 PROCEDURE — 99285 EMERGENCY DEPT VISIT HI MDM: CPT

## 2021-12-07 PROCEDURE — 30233N1 TRANSFUSION OF NONAUTOLOGOUS RED BLOOD CELLS INTO PERIPHERAL VEIN, PERCUTANEOUS APPROACH: ICD-10-PCS | Performed by: INTERNAL MEDICINE

## 2021-12-07 RX ORDER — CHLORAL HYDRATE 500 MG
1000 CAPSULE ORAL DAILY
Status: DISCONTINUED | OUTPATIENT
Start: 2021-12-08 | End: 2021-12-10 | Stop reason: HOSPADM

## 2021-12-07 RX ORDER — POTASSIUM CHLORIDE 20 MEQ/1
40 TABLET, EXTENDED RELEASE ORAL ONCE
Status: COMPLETED | OUTPATIENT
Start: 2021-12-07 | End: 2021-12-07

## 2021-12-07 RX ORDER — ONDANSETRON 2 MG/ML
4 INJECTION INTRAMUSCULAR; INTRAVENOUS EVERY 6 HOURS PRN
Status: DISCONTINUED | OUTPATIENT
Start: 2021-12-07 | End: 2021-12-10 | Stop reason: HOSPADM

## 2021-12-07 RX ORDER — ASCORBIC ACID 500 MG
500 TABLET ORAL DAILY
Status: DISCONTINUED | OUTPATIENT
Start: 2021-12-08 | End: 2021-12-10 | Stop reason: HOSPADM

## 2021-12-07 RX ORDER — ACETAMINOPHEN 325 MG/1
650 TABLET ORAL EVERY 6 HOURS PRN
Status: DISCONTINUED | OUTPATIENT
Start: 2021-12-07 | End: 2021-12-10 | Stop reason: HOSPADM

## 2021-12-07 RX ORDER — FERROUS SULFATE 325(65) MG
325 TABLET ORAL 2 TIMES DAILY WITH MEALS
Status: DISCONTINUED | OUTPATIENT
Start: 2021-12-09 | End: 2021-12-08

## 2021-12-07 RX ORDER — B-COMPLEX WITH VITAMIN C
1 TABLET ORAL
Status: DISCONTINUED | OUTPATIENT
Start: 2021-12-08 | End: 2021-12-10 | Stop reason: HOSPADM

## 2021-12-07 RX ORDER — SENNOSIDES 8.6 MG
1 TABLET ORAL
Status: DISCONTINUED | OUTPATIENT
Start: 2021-12-07 | End: 2021-12-10 | Stop reason: HOSPADM

## 2021-12-07 RX ADMIN — POTASSIUM CHLORIDE 40 MEQ: 1500 TABLET, EXTENDED RELEASE ORAL at 19:27

## 2021-12-08 ENCOUNTER — TELEPHONE (OUTPATIENT)
Dept: GASTROENTEROLOGY | Facility: HOSPITAL | Age: 51
End: 2021-12-08

## 2021-12-08 PROBLEM — F43.9 STRESS AT HOME: Status: ACTIVE | Noted: 2021-12-08

## 2021-12-08 PROBLEM — E87.6 HYPOKALEMIA: Status: RESOLVED | Noted: 2021-12-07 | Resolved: 2021-12-08

## 2021-12-08 LAB
ABO GROUP BLD BPU: NORMAL
ANION GAP SERPL CALCULATED.3IONS-SCNC: 9 MMOL/L (ref 4–13)
BASOPHILS # BLD AUTO: 0.03 THOUSANDS/ΜL (ref 0–0.1)
BASOPHILS NFR BLD AUTO: 1 % (ref 0–1)
BPU ID: NORMAL
BUN SERPL-MCNC: 8 MG/DL (ref 5–25)
CALCIUM SERPL-MCNC: 8 MG/DL (ref 8.3–10.1)
CHLORIDE SERPL-SCNC: 107 MMOL/L (ref 100–108)
CO2 SERPL-SCNC: 22 MMOL/L (ref 21–32)
CREAT SERPL-MCNC: 0.88 MG/DL (ref 0.6–1.3)
CROSSMATCH: NORMAL
EOSINOPHIL # BLD AUTO: 0.03 THOUSAND/ΜL (ref 0–0.61)
EOSINOPHIL NFR BLD AUTO: 1 % (ref 0–6)
ERYTHROCYTE [DISTWIDTH] IN BLOOD BY AUTOMATED COUNT: 20.7 % (ref 11.6–15.1)
FERRITIN SERPL-MCNC: 2 NG/ML (ref 8–388)
GFR SERPL CREATININE-BSD FRML MDRD: 76 ML/MIN/1.73SQ M
GLUCOSE SERPL-MCNC: 87 MG/DL (ref 65–140)
HCT VFR BLD AUTO: 24.2 % (ref 34.8–46.1)
HCT VFR BLD AUTO: 32.4 % (ref 34.8–46.1)
HGB BLD-MCNC: 7.8 G/DL (ref 11.5–15.4)
HGB BLD-MCNC: 9.7 G/DL (ref 11.5–15.4)
IMM GRANULOCYTES # BLD AUTO: 0.01 THOUSAND/UL (ref 0–0.2)
IMM GRANULOCYTES NFR BLD AUTO: 0 % (ref 0–2)
IRON SATN MFR SERPL: 2 % (ref 15–50)
IRON SERPL-MCNC: 10 UG/DL (ref 50–170)
LYMPHOCYTES # BLD AUTO: 0.41 THOUSANDS/ΜL (ref 0.6–4.47)
LYMPHOCYTES NFR BLD AUTO: 12 % (ref 14–44)
MAGNESIUM SERPL-MCNC: 1.7 MG/DL (ref 1.6–2.6)
MCH RBC QN AUTO: 27.2 PG (ref 26.8–34.3)
MCHC RBC AUTO-ENTMCNC: 32.2 G/DL (ref 31.4–37.4)
MCV RBC AUTO: 84 FL (ref 82–98)
MONOCYTES # BLD AUTO: 0.3 THOUSAND/ΜL (ref 0.17–1.22)
MONOCYTES NFR BLD AUTO: 9 % (ref 4–12)
NEUTROPHILS # BLD AUTO: 2.61 THOUSANDS/ΜL (ref 1.85–7.62)
NEUTS SEG NFR BLD AUTO: 77 % (ref 43–75)
NRBC BLD AUTO-RTO: 0 /100 WBCS
PLATELET # BLD AUTO: 211 THOUSANDS/UL (ref 149–390)
PMV BLD AUTO: 10.5 FL (ref 8.9–12.7)
POTASSIUM SERPL-SCNC: 3.9 MMOL/L (ref 3.5–5.3)
RBC # BLD AUTO: 2.87 MILLION/UL (ref 3.81–5.12)
SODIUM SERPL-SCNC: 138 MMOL/L (ref 136–145)
TIBC SERPL-MCNC: 453 UG/DL (ref 250–450)
UNIT DISPENSE STATUS: NORMAL
UNIT PRODUCT CODE: NORMAL
UNIT PRODUCT VOLUME: 225 ML
UNIT PRODUCT VOLUME: 350 ML
UNIT RH: NORMAL
WBC # BLD AUTO: 3.39 THOUSAND/UL (ref 4.31–10.16)

## 2021-12-08 PROCEDURE — 99232 SBSQ HOSP IP/OBS MODERATE 35: CPT | Performed by: PHYSICIAN ASSISTANT

## 2021-12-08 PROCEDURE — P9016 RBC LEUKOCYTES REDUCED: HCPCS

## 2021-12-08 PROCEDURE — 99254 IP/OBS CNSLTJ NEW/EST MOD 60: CPT | Performed by: INTERNAL MEDICINE

## 2021-12-08 PROCEDURE — 85025 COMPLETE CBC W/AUTO DIFF WBC: CPT | Performed by: INTERNAL MEDICINE

## 2021-12-08 PROCEDURE — 83735 ASSAY OF MAGNESIUM: CPT | Performed by: INTERNAL MEDICINE

## 2021-12-08 PROCEDURE — 36415 COLL VENOUS BLD VENIPUNCTURE: CPT | Performed by: INTERNAL MEDICINE

## 2021-12-08 PROCEDURE — 85014 HEMATOCRIT: CPT | Performed by: PHYSICIAN ASSISTANT

## 2021-12-08 PROCEDURE — 85018 HEMOGLOBIN: CPT | Performed by: PHYSICIAN ASSISTANT

## 2021-12-08 PROCEDURE — 80048 BASIC METABOLIC PNL TOTAL CA: CPT | Performed by: INTERNAL MEDICINE

## 2021-12-08 RX ORDER — POLYETHYLENE GLYCOL 3350 17 G/17G
238 POWDER, FOR SOLUTION ORAL ONCE
Status: COMPLETED | OUTPATIENT
Start: 2021-12-08 | End: 2021-12-08

## 2021-12-08 RX ADMIN — Medication 1 TABLET: at 07:32

## 2021-12-08 RX ADMIN — OMEGA-3 FATTY ACIDS CAP 1000 MG 1000 MG: 1000 CAP at 12:47

## 2021-12-08 RX ADMIN — BISACODYL 10 MG: 5 TABLET, COATED ORAL at 19:22

## 2021-12-08 RX ADMIN — OXYCODONE HYDROCHLORIDE AND ACETAMINOPHEN 500 MG: 500 TABLET ORAL at 12:52

## 2021-12-08 RX ADMIN — IRON SUCROSE 200 MG: 20 INJECTION, SOLUTION INTRAVENOUS at 13:14

## 2021-12-08 RX ADMIN — POLYETHYLENE GLYCOL 3350 238 G: 17 POWDER, FOR SOLUTION ORAL at 19:21

## 2021-12-09 ENCOUNTER — ANESTHESIA EVENT (INPATIENT)
Dept: GASTROENTEROLOGY | Facility: HOSPITAL | Age: 51
DRG: 254 | End: 2021-12-09
Payer: COMMERCIAL

## 2021-12-09 ENCOUNTER — ANESTHESIA (INPATIENT)
Dept: GASTROENTEROLOGY | Facility: HOSPITAL | Age: 51
DRG: 254 | End: 2021-12-09
Payer: COMMERCIAL

## 2021-12-09 ENCOUNTER — APPOINTMENT (OUTPATIENT)
Dept: GASTROENTEROLOGY | Facility: HOSPITAL | Age: 51
DRG: 254 | End: 2021-12-09
Payer: COMMERCIAL

## 2021-12-09 LAB
ANION GAP SERPL CALCULATED.3IONS-SCNC: 11 MMOL/L (ref 4–13)
BUN SERPL-MCNC: 8 MG/DL (ref 5–25)
CALCIUM SERPL-MCNC: 8 MG/DL (ref 8.3–10.1)
CHLORIDE SERPL-SCNC: 106 MMOL/L (ref 100–108)
CO2 SERPL-SCNC: 23 MMOL/L (ref 21–32)
CREAT SERPL-MCNC: 0.9 MG/DL (ref 0.6–1.3)
GFR SERPL CREATININE-BSD FRML MDRD: 74 ML/MIN/1.73SQ M
GLUCOSE SERPL-MCNC: 81 MG/DL (ref 65–140)
HCT VFR BLD AUTO: 24.7 % (ref 34.8–46.1)
HCT VFR BLD AUTO: 27.8 % (ref 34.8–46.1)
HGB BLD-MCNC: 7.7 G/DL (ref 11.5–15.4)
HGB BLD-MCNC: 8.7 G/DL (ref 11.5–15.4)
POTASSIUM SERPL-SCNC: 3.5 MMOL/L (ref 3.5–5.3)
SODIUM SERPL-SCNC: 140 MMOL/L (ref 136–145)

## 2021-12-09 PROCEDURE — 85018 HEMOGLOBIN: CPT | Performed by: INTERNAL MEDICINE

## 2021-12-09 PROCEDURE — 0DB98ZX EXCISION OF DUODENUM, VIA NATURAL OR ARTIFICIAL OPENING ENDOSCOPIC, DIAGNOSTIC: ICD-10-PCS | Performed by: INTERNAL MEDICINE

## 2021-12-09 PROCEDURE — 88305 TISSUE EXAM BY PATHOLOGIST: CPT | Performed by: PATHOLOGY

## 2021-12-09 PROCEDURE — 99232 SBSQ HOSP IP/OBS MODERATE 35: CPT | Performed by: PHYSICIAN ASSISTANT

## 2021-12-09 PROCEDURE — 0DB78ZX EXCISION OF STOMACH, PYLORUS, VIA NATURAL OR ARTIFICIAL OPENING ENDOSCOPIC, DIAGNOSTIC: ICD-10-PCS | Performed by: INTERNAL MEDICINE

## 2021-12-09 PROCEDURE — 80048 BASIC METABOLIC PNL TOTAL CA: CPT | Performed by: PHYSICIAN ASSISTANT

## 2021-12-09 PROCEDURE — 43239 EGD BIOPSY SINGLE/MULTIPLE: CPT | Performed by: INTERNAL MEDICINE

## 2021-12-09 PROCEDURE — 0W3P8ZZ CONTROL BLEEDING IN GASTROINTESTINAL TRACT, VIA NATURAL OR ARTIFICIAL OPENING ENDOSCOPIC: ICD-10-PCS | Performed by: INTERNAL MEDICINE

## 2021-12-09 PROCEDURE — 85014 HEMATOCRIT: CPT | Performed by: INTERNAL MEDICINE

## 2021-12-09 PROCEDURE — 45388 COLONOSCOPY W/ABLATION: CPT | Performed by: INTERNAL MEDICINE

## 2021-12-09 RX ORDER — PROPOFOL 10 MG/ML
INJECTION, EMULSION INTRAVENOUS CONTINUOUS PRN
Status: DISCONTINUED | OUTPATIENT
Start: 2021-12-09 | End: 2021-12-09

## 2021-12-09 RX ORDER — PROPOFOL 10 MG/ML
INJECTION, EMULSION INTRAVENOUS AS NEEDED
Status: DISCONTINUED | OUTPATIENT
Start: 2021-12-09 | End: 2021-12-09

## 2021-12-09 RX ORDER — LIDOCAINE HYDROCHLORIDE 10 MG/ML
INJECTION, SOLUTION EPIDURAL; INFILTRATION; INTRACAUDAL; PERINEURAL AS NEEDED
Status: DISCONTINUED | OUTPATIENT
Start: 2021-12-09 | End: 2021-12-09

## 2021-12-09 RX ORDER — SODIUM CHLORIDE, SODIUM LACTATE, POTASSIUM CHLORIDE, CALCIUM CHLORIDE 600; 310; 30; 20 MG/100ML; MG/100ML; MG/100ML; MG/100ML
INJECTION, SOLUTION INTRAVENOUS CONTINUOUS PRN
Status: DISCONTINUED | OUTPATIENT
Start: 2021-12-09 | End: 2021-12-09

## 2021-12-09 RX ADMIN — PROPOFOL 100 MG: 10 INJECTION, EMULSION INTRAVENOUS at 15:57

## 2021-12-09 RX ADMIN — OXYCODONE HYDROCHLORIDE AND ACETAMINOPHEN 500 MG: 500 TABLET ORAL at 08:40

## 2021-12-09 RX ADMIN — LIDOCAINE HYDROCHLORIDE 60 MG: 10 INJECTION, SOLUTION EPIDURAL; INFILTRATION; INTRACAUDAL; PERINEURAL at 15:57

## 2021-12-09 RX ADMIN — OMEGA-3 FATTY ACIDS CAP 1000 MG 1000 MG: 1000 CAP at 08:40

## 2021-12-09 RX ADMIN — ACETAMINOPHEN 650 MG: 325 TABLET, FILM COATED ORAL at 17:46

## 2021-12-09 RX ADMIN — PROPOFOL 80 MCG/KG/MIN: 10 INJECTION, EMULSION INTRAVENOUS at 15:57

## 2021-12-09 RX ADMIN — IRON SUCROSE 200 MG: 20 INJECTION, SOLUTION INTRAVENOUS at 09:22

## 2021-12-09 RX ADMIN — Medication 1 TABLET: at 08:40

## 2021-12-09 RX ADMIN — SODIUM CHLORIDE, SODIUM LACTATE, POTASSIUM CHLORIDE, AND CALCIUM CHLORIDE: .6; .31; .03; .02 INJECTION, SOLUTION INTRAVENOUS at 15:52

## 2021-12-10 VITALS
WEIGHT: 128.31 LBS | BODY MASS INDEX: 19.45 KG/M2 | HEART RATE: 71 BPM | HEIGHT: 68 IN | TEMPERATURE: 99.4 F | RESPIRATION RATE: 18 BRPM | SYSTOLIC BLOOD PRESSURE: 95 MMHG | OXYGEN SATURATION: 98 % | DIASTOLIC BLOOD PRESSURE: 54 MMHG

## 2021-12-10 LAB
ANION GAP SERPL CALCULATED.3IONS-SCNC: 12 MMOL/L (ref 4–13)
BASOPHILS # BLD AUTO: 0.04 THOUSANDS/ΜL (ref 0–0.1)
BASOPHILS NFR BLD AUTO: 1 % (ref 0–1)
BUN SERPL-MCNC: 6 MG/DL (ref 5–25)
CALCIUM SERPL-MCNC: 7.9 MG/DL (ref 8.3–10.1)
CHLORIDE SERPL-SCNC: 107 MMOL/L (ref 100–108)
CO2 SERPL-SCNC: 20 MMOL/L (ref 21–32)
CREAT SERPL-MCNC: 0.85 MG/DL (ref 0.6–1.3)
EOSINOPHIL # BLD AUTO: 0.06 THOUSAND/ΜL (ref 0–0.61)
EOSINOPHIL NFR BLD AUTO: 1 % (ref 0–6)
ERYTHROCYTE [DISTWIDTH] IN BLOOD BY AUTOMATED COUNT: 22.4 % (ref 11.6–15.1)
GFR SERPL CREATININE-BSD FRML MDRD: 79 ML/MIN/1.73SQ M
GLUCOSE SERPL-MCNC: 86 MG/DL (ref 65–140)
HCT VFR BLD AUTO: 25.7 % (ref 34.8–46.1)
HGB BLD-MCNC: 7.8 G/DL (ref 11.5–15.4)
IMM GRANULOCYTES # BLD AUTO: 0.09 THOUSAND/UL (ref 0–0.2)
IMM GRANULOCYTES NFR BLD AUTO: 1 % (ref 0–2)
LYMPHOCYTES # BLD AUTO: 0.46 THOUSANDS/ΜL (ref 0.6–4.47)
LYMPHOCYTES NFR BLD AUTO: 7 % (ref 14–44)
MCH RBC QN AUTO: 26.1 PG (ref 26.8–34.3)
MCHC RBC AUTO-ENTMCNC: 30.4 G/DL (ref 31.4–37.4)
MCV RBC AUTO: 86 FL (ref 82–98)
MONOCYTES # BLD AUTO: 0.75 THOUSAND/ΜL (ref 0.17–1.22)
MONOCYTES NFR BLD AUTO: 12 % (ref 4–12)
NEUTROPHILS # BLD AUTO: 5.03 THOUSANDS/ΜL (ref 1.85–7.62)
NEUTS SEG NFR BLD AUTO: 78 % (ref 43–75)
NRBC BLD AUTO-RTO: 1 /100 WBCS
PLATELET # BLD AUTO: 222 THOUSANDS/UL (ref 149–390)
PMV BLD AUTO: 10.4 FL (ref 8.9–12.7)
POTASSIUM SERPL-SCNC: 3.5 MMOL/L (ref 3.5–5.3)
RBC # BLD AUTO: 2.99 MILLION/UL (ref 3.81–5.12)
SODIUM SERPL-SCNC: 139 MMOL/L (ref 136–145)
WBC # BLD AUTO: 6.43 THOUSAND/UL (ref 4.31–10.16)

## 2021-12-10 PROCEDURE — 80048 BASIC METABOLIC PNL TOTAL CA: CPT | Performed by: INTERNAL MEDICINE

## 2021-12-10 PROCEDURE — 99239 HOSP IP/OBS DSCHRG MGMT >30: CPT | Performed by: PHYSICIAN ASSISTANT

## 2021-12-10 PROCEDURE — 99232 SBSQ HOSP IP/OBS MODERATE 35: CPT | Performed by: INTERNAL MEDICINE

## 2021-12-10 PROCEDURE — 85025 COMPLETE CBC W/AUTO DIFF WBC: CPT | Performed by: INTERNAL MEDICINE

## 2021-12-10 RX ORDER — MESALAMINE 1000 MG/1
1000 SUPPOSITORY RECTAL
Qty: 30 SUPPOSITORY | Refills: 2 | Status: SHIPPED | OUTPATIENT
Start: 2021-12-17 | End: 2022-03-17

## 2021-12-10 RX ORDER — ASCORBIC ACID 500 MG
250 TABLET ORAL 2 TIMES DAILY
Qty: 30 TABLET | Refills: 0 | Status: SHIPPED | OUTPATIENT
Start: 2021-12-10

## 2021-12-10 RX ORDER — POLYETHYLENE GLYCOL 3350 17 G/17G
17 POWDER, FOR SOLUTION ORAL DAILY
Status: DISCONTINUED | OUTPATIENT
Start: 2021-12-10 | End: 2021-12-10 | Stop reason: HOSPADM

## 2021-12-10 RX ORDER — POLYETHYLENE GLYCOL 3350 17 G/17G
17 POWDER, FOR SOLUTION ORAL DAILY
Qty: 30 EACH | Refills: 0 | Status: SHIPPED | OUTPATIENT
Start: 2021-12-11 | End: 2022-03-17 | Stop reason: ALTCHOICE

## 2021-12-10 RX ORDER — B-COMPLEX WITH VITAMIN C
1 TABLET ORAL
Refills: 0
Start: 2021-12-11

## 2021-12-10 RX ORDER — MESALAMINE 1000 MG/1
1000 SUPPOSITORY RECTAL
Qty: 30 SUPPOSITORY | Refills: 0 | Status: SHIPPED | OUTPATIENT
Start: 2021-12-17 | End: 2021-12-10 | Stop reason: SDUPTHER

## 2021-12-10 RX ADMIN — OXYCODONE HYDROCHLORIDE AND ACETAMINOPHEN 500 MG: 500 TABLET ORAL at 08:59

## 2021-12-10 RX ADMIN — IRON SUCROSE 200 MG: 20 INJECTION, SOLUTION INTRAVENOUS at 09:13

## 2021-12-10 RX ADMIN — OMEGA-3 FATTY ACIDS CAP 1000 MG 1000 MG: 1000 CAP at 08:59

## 2021-12-10 RX ADMIN — Medication 1 TABLET: at 08:59

## 2021-12-13 ENCOUNTER — TELEPHONE (OUTPATIENT)
Dept: GASTROENTEROLOGY | Facility: AMBULARY SURGERY CENTER | Age: 51
End: 2021-12-13

## 2021-12-16 ENCOUNTER — TELEPHONE (OUTPATIENT)
Dept: GASTROENTEROLOGY | Facility: CLINIC | Age: 51
End: 2021-12-16

## 2021-12-21 ENCOUNTER — TELEPHONE (OUTPATIENT)
Dept: GASTROENTEROLOGY | Facility: CLINIC | Age: 51
End: 2021-12-21

## 2021-12-22 ENCOUNTER — TELEPHONE (OUTPATIENT)
Dept: GASTROENTEROLOGY | Facility: CLINIC | Age: 51
End: 2021-12-22

## 2022-03-17 ENCOUNTER — HOSPITAL ENCOUNTER (OUTPATIENT)
Dept: GASTROENTEROLOGY | Facility: HOSPITAL | Age: 52
Setting detail: OUTPATIENT SURGERY
Discharge: HOME | End: 2022-03-17
Attending: INTERNAL MEDICINE | Admitting: INTERNAL MEDICINE
Payer: COMMERCIAL

## 2022-03-17 ENCOUNTER — ANESTHESIA (OUTPATIENT)
Dept: GASTROENTEROLOGY | Facility: HOSPITAL | Age: 52
End: 2022-03-17

## 2022-03-17 ENCOUNTER — ANESTHESIA EVENT (OUTPATIENT)
Dept: GASTROENTEROLOGY | Facility: HOSPITAL | Age: 52
End: 2022-03-17

## 2022-03-17 VITALS
RESPIRATION RATE: 15 BRPM | WEIGHT: 120 LBS | HEIGHT: 68 IN | TEMPERATURE: 97.4 F | DIASTOLIC BLOOD PRESSURE: 58 MMHG | SYSTOLIC BLOOD PRESSURE: 127 MMHG | OXYGEN SATURATION: 100 % | BODY MASS INDEX: 18.19 KG/M2 | HEART RATE: 52 BPM

## 2022-03-17 DIAGNOSIS — K62.7 RADIATION PROCTITIS: ICD-10-CM

## 2022-03-17 DIAGNOSIS — K92.2 LOWER GI BLEEDING: ICD-10-CM

## 2022-03-17 LAB
BASOPHILS # BLD AUTO: 0.04 THOUSANDS/ΜL (ref 0–0.1)
BASOPHILS NFR BLD AUTO: 1 % (ref 0–1)
EOSINOPHIL # BLD AUTO: 0.27 THOUSAND/ΜL (ref 0–0.61)
EOSINOPHIL NFR BLD AUTO: 8 % (ref 0–6)
ERYTHROCYTE [DISTWIDTH] IN BLOOD BY AUTOMATED COUNT: 12.9 % (ref 11.6–15.1)
HCT VFR BLD AUTO: 42.1 % (ref 34.8–46.1)
HGB BLD-MCNC: 13.7 G/DL (ref 11.5–15.4)
IMM GRANULOCYTES # BLD AUTO: 0 THOUSAND/UL (ref 0–0.2)
IMM GRANULOCYTES NFR BLD AUTO: 0 % (ref 0–2)
LYMPHOCYTES # BLD AUTO: 0.63 THOUSANDS/ΜL (ref 0.6–4.47)
LYMPHOCYTES NFR BLD AUTO: 19 % (ref 14–44)
MCH RBC QN AUTO: 29.8 PG (ref 26.8–34.3)
MCHC RBC AUTO-ENTMCNC: 32.5 G/DL (ref 31.4–37.4)
MCV RBC AUTO: 92 FL (ref 82–98)
MONOCYTES # BLD AUTO: 0.42 THOUSAND/ΜL (ref 0.17–1.22)
MONOCYTES NFR BLD AUTO: 13 % (ref 4–12)
NEUTROPHILS # BLD AUTO: 2.01 THOUSANDS/ΜL (ref 1.85–7.62)
NEUTS SEG NFR BLD AUTO: 59 % (ref 43–75)
NRBC BLD AUTO-RTO: 0 /100 WBCS
PLATELET # BLD AUTO: 206 THOUSANDS/UL (ref 149–390)
PMV BLD AUTO: 9.5 FL (ref 8.9–12.7)
RBC # BLD AUTO: 4.59 MILLION/UL (ref 3.81–5.12)
WBC # BLD AUTO: 3.37 THOUSAND/UL (ref 4.31–10.16)

## 2022-03-17 PROCEDURE — 45334 SIGMOIDOSCOPY FOR BLEEDING: CPT | Performed by: INTERNAL MEDICINE

## 2022-03-17 PROCEDURE — 85025 COMPLETE CBC W/AUTO DIFF WBC: CPT | Performed by: INTERNAL MEDICINE

## 2022-03-17 RX ORDER — PROPOFOL 10 MG/ML
INJECTION, EMULSION INTRAVENOUS AS NEEDED
Status: DISCONTINUED | OUTPATIENT
Start: 2022-03-17 | End: 2022-03-17

## 2022-03-17 RX ORDER — LIDOCAINE HYDROCHLORIDE 20 MG/ML
INJECTION, SOLUTION EPIDURAL; INFILTRATION; INTRACAUDAL; PERINEURAL AS NEEDED
Status: DISCONTINUED | OUTPATIENT
Start: 2022-03-17 | End: 2022-03-17

## 2022-03-17 RX ORDER — MAGNESIUM CARB/ALUMINUM HYDROX 105-160MG
296 TABLET,CHEWABLE ORAL ONCE
COMMUNITY

## 2022-03-17 RX ORDER — PROPOFOL 10 MG/ML
INJECTION, EMULSION INTRAVENOUS CONTINUOUS PRN
Status: DISCONTINUED | OUTPATIENT
Start: 2022-03-17 | End: 2022-03-17

## 2022-03-17 RX ORDER — MESALAMINE 1000 MG/1
1000 SUPPOSITORY RECTAL
Qty: 30 SUPPOSITORY | Refills: 2 | Status: SHIPPED | OUTPATIENT
Start: 2022-03-17 | End: 2022-04-16

## 2022-03-17 RX ORDER — SODIUM CHLORIDE 9 MG/ML
INJECTION, SOLUTION INTRAVENOUS CONTINUOUS PRN
Status: DISCONTINUED | OUTPATIENT
Start: 2022-03-17 | End: 2022-03-17

## 2022-03-17 RX ADMIN — PROPOFOL 100 MCG/KG/MIN: 10 INJECTION, EMULSION INTRAVENOUS at 12:41

## 2022-03-17 RX ADMIN — PROPOFOL 80 MG: 10 INJECTION, EMULSION INTRAVENOUS at 12:41

## 2022-03-17 RX ADMIN — LIDOCAINE HYDROCHLORIDE 80 MG: 20 INJECTION, SOLUTION EPIDURAL; INFILTRATION; INTRACAUDAL at 12:41

## 2022-03-17 RX ADMIN — SODIUM CHLORIDE: 0.9 INJECTION, SOLUTION INTRAVENOUS at 11:55

## 2022-03-17 RX ADMIN — PROPOFOL 40 MG: 10 INJECTION, EMULSION INTRAVENOUS at 12:43

## 2022-03-17 NOTE — H&P
History and Physical -  [facility]  Girish Rangel 45 y o  female MRN: [id number]    HPI: Girish Rangel is a 45y o  year old female who presents with radiation proctitis  Review of Systems    Historical Information   Past Medical History:   Diagnosis Date    Cervical cancer ([facility] [address] )     cervical     No past surgical history on file  Social History   Social History     Substance and Sexual Activity   Alcohol Use Not Currently     Social History     Substance and Sexual Activity   Drug Use Never     Social History     Tobacco Use   Smoking Status Never Smoker   Smokeless Tobacco Never Used     No family history on file  Meds/Allergies     (Not in a hospital admission)      No Known Allergies    Objective     /55   Pulse 66   Temp 97 5 °F (36 4 °C) (Temporal)   Resp 16   Ht 5' 7 5" (1 715 m)   Wt 54 4 kg (120 lb)   SpO2 99%   BMI 18 52 kg/m²       PHYSICAL EXAM    Gen: NAD  CV: RRR  CHEST: Clear  ABD: soft, NT/ND  EXT: no edema  Neuro: AAO      ASSESSMENT/PLAN:  This is a 45y o  year old female here for radiation proctitis       PLAN:   Procedure: flex sig

## 2022-03-17 NOTE — ANESTHESIA POSTPROCEDURE EVALUATION
Post-Op Assessment Note    CV Status:  Stable  Pain Score: 0    Pain management: adequate     Mental Status:  Sleepy and arousable   Hydration Status:  Euvolemic   PONV Controlled:  Controlled   Airway Patency:  Patent      Post Op Vitals Reviewed: Yes      Staff: CRNA         No complications documented      BP   112/56   Temp      Pulse  77   Resp   15   SpO2   99

## 2022-03-17 NOTE — ANESTHESIA PREPROCEDURE EVALUATION
Procedure:  FLEXIBLE SIGMOIDOSCOPY    Relevant Problems   GI/HEPATIC   (+) Lower GI bleeding      GYN   (+) Cervical cancer (HCC) (had radiation )      HEMATOLOGY   (+) Acute on chronic blood loss anemia with history of iron deficiency anemia   (+) Iron deficiency anemia        Physical Exam    Airway    Mallampati score: II  TM Distance: >3 FB  Neck ROM: full     Dental       Cardiovascular  Cardiovascular exam normal    Pulmonary  Pulmonary exam normal     Other Findings        Anesthesia Plan  ASA Score- 2     Anesthesia Type- IV sedation with anesthesia with ASA Monitors  Additional Monitors:   Airway Plan:           Plan Factors-Exercise tolerance (METS): >4 METS  Chart reviewed  Existing labs reviewed  Patient summary reviewed  Patient is not a current smoker  Patient not instructed to abstain from smoking on day of procedure  Patient did not smoke on day of surgery  Induction-     Postoperative Plan-     Informed Consent- Anesthetic plan and risks discussed with patient  I personally reviewed this patient with the CRNA  Discussed and agreed on the Anesthesia Plan with the CRNA  Charleen Nolen             No results found for: HGBA1C    Lab Results   Component Value Date    K 3 5 12/10/2021     12/10/2021    CO2 20 (L) 12/10/2021    BUN 6 12/10/2021    CREATININE 0 85 12/10/2021    CALCIUM 7 9 (L) 12/10/2021    AST 16 12/07/2021    ALT 35 12/07/2021    ALKPHOS 55 12/07/2021    EGFR 79 12/10/2021       Lab Results   Component Value Date    WBC 6 43 12/10/2021    HGB 7 8 (L) 12/10/2021    HCT 25 7 (L) 12/10/2021    MCV 86 12/10/2021     12/10/2021     Normal sinus rhythm  Nonspecific ST and T wave abnormality  Abnormal ECG  When compared with ECG of 24-AUG-2018 20:17,  No significant change was found     Confirmed by Demetrice Hagan ([id number]) on 12/7/2021 8:47:15 PM

## 2022-03-23 ENCOUNTER — TELEPHONE (OUTPATIENT)
Dept: GASTROENTEROLOGY | Facility: CLINIC | Age: 52
End: 2022-03-23

## 2022-03-23 NOTE — TELEPHONE ENCOUNTER
Left VM with normal results, asking pt to call back with any questions and to sched fu appt for 3-4m

## 2022-03-23 NOTE — TELEPHONE ENCOUNTER
----- Message from Pedrito Prater MD sent at 3/20/2022  3:04 PM EDT -----  Please call the patient inform her that her recent blood count was completely normal which is great news  Please have her follow up in the office with me in 3 to 4 months